# Patient Record
Sex: FEMALE | Race: BLACK OR AFRICAN AMERICAN | NOT HISPANIC OR LATINO
[De-identification: names, ages, dates, MRNs, and addresses within clinical notes are randomized per-mention and may not be internally consistent; named-entity substitution may affect disease eponyms.]

---

## 2018-03-29 ENCOUNTER — APPOINTMENT (OUTPATIENT)
Dept: OBGYN | Facility: CLINIC | Age: 38
End: 2018-03-29
Payer: COMMERCIAL

## 2018-03-29 VITALS
HEIGHT: 66 IN | WEIGHT: 182.44 LBS | DIASTOLIC BLOOD PRESSURE: 95 MMHG | BODY MASS INDEX: 29.32 KG/M2 | SYSTOLIC BLOOD PRESSURE: 139 MMHG | HEART RATE: 76 BPM

## 2018-03-29 PROCEDURE — 99205 OFFICE O/P NEW HI 60 MIN: CPT

## 2018-05-01 ENCOUNTER — APPOINTMENT (OUTPATIENT)
Dept: OBGYN | Facility: CLINIC | Age: 38
End: 2018-05-01

## 2018-05-09 ENCOUNTER — OUTPATIENT (OUTPATIENT)
Dept: OUTPATIENT SERVICES | Facility: HOSPITAL | Age: 38
LOS: 1 days | End: 2018-05-09

## 2018-05-09 VITALS
TEMPERATURE: 98 F | HEART RATE: 79 BPM | SYSTOLIC BLOOD PRESSURE: 130 MMHG | HEIGHT: 60.25 IN | DIASTOLIC BLOOD PRESSURE: 80 MMHG | RESPIRATION RATE: 16 BRPM | WEIGHT: 179.9 LBS

## 2018-05-09 DIAGNOSIS — Z90.49 ACQUIRED ABSENCE OF OTHER SPECIFIED PARTS OF DIGESTIVE TRACT: Chronic | ICD-10-CM

## 2018-05-09 DIAGNOSIS — N83.9 NONINFLAMMATORY DISORDER OF OVARY, FALLOPIAN TUBE AND BROAD LIGAMENT, UNSPECIFIED: ICD-10-CM

## 2018-05-09 DIAGNOSIS — Z98.890 OTHER SPECIFIED POSTPROCEDURAL STATES: Chronic | ICD-10-CM

## 2018-05-09 DIAGNOSIS — R23.8 OTHER SKIN CHANGES: ICD-10-CM

## 2018-05-09 DIAGNOSIS — D25.0 SUBMUCOUS LEIOMYOMA OF UTERUS: ICD-10-CM

## 2018-05-09 LAB
APTT BLD: 37.6 SEC — HIGH (ref 27.5–37.4)
BLD GP AB SCN SERPL QL: NEGATIVE — SIGNIFICANT CHANGE UP
BUN SERPL-MCNC: 16 MG/DL — SIGNIFICANT CHANGE UP (ref 7–23)
CALCIUM SERPL-MCNC: 9.5 MG/DL — SIGNIFICANT CHANGE UP (ref 8.4–10.5)
CHLORIDE SERPL-SCNC: 102 MMOL/L — SIGNIFICANT CHANGE UP (ref 98–107)
CO2 SERPL-SCNC: 26 MMOL/L — SIGNIFICANT CHANGE UP (ref 22–31)
CREAT SERPL-MCNC: 0.8 MG/DL — SIGNIFICANT CHANGE UP (ref 0.5–1.3)
GLUCOSE SERPL-MCNC: 65 MG/DL — LOW (ref 70–99)
HCT VFR BLD CALC: 40.5 % — SIGNIFICANT CHANGE UP (ref 34.5–45)
HGB BLD-MCNC: 12.8 G/DL — SIGNIFICANT CHANGE UP (ref 11.5–15.5)
INR BLD: 1.08 — SIGNIFICANT CHANGE UP (ref 0.88–1.17)
MCHC RBC-ENTMCNC: 27.2 PG — SIGNIFICANT CHANGE UP (ref 27–34)
MCHC RBC-ENTMCNC: 31.6 % — LOW (ref 32–36)
MCV RBC AUTO: 86 FL — SIGNIFICANT CHANGE UP (ref 80–100)
NRBC # FLD: 0 — SIGNIFICANT CHANGE UP
PLATELET # BLD AUTO: 250 K/UL — SIGNIFICANT CHANGE UP (ref 150–400)
PMV BLD: 11.5 FL — SIGNIFICANT CHANGE UP (ref 7–13)
POTASSIUM SERPL-MCNC: 4.1 MMOL/L — SIGNIFICANT CHANGE UP (ref 3.5–5.3)
POTASSIUM SERPL-SCNC: 4.1 MMOL/L — SIGNIFICANT CHANGE UP (ref 3.5–5.3)
PROTHROM AB SERPL-ACNC: 12 SEC — SIGNIFICANT CHANGE UP (ref 9.8–13.1)
RBC # BLD: 4.71 M/UL — SIGNIFICANT CHANGE UP (ref 3.8–5.2)
RBC # FLD: 13.6 % — SIGNIFICANT CHANGE UP (ref 10.3–14.5)
RH IG SCN BLD-IMP: POSITIVE — SIGNIFICANT CHANGE UP
SODIUM SERPL-SCNC: 140 MMOL/L — SIGNIFICANT CHANGE UP (ref 135–145)
WBC # BLD: 6.52 K/UL — SIGNIFICANT CHANGE UP (ref 3.8–10.5)
WBC # FLD AUTO: 6.52 K/UL — SIGNIFICANT CHANGE UP (ref 3.8–10.5)

## 2018-05-09 NOTE — H&P PST ADULT - ASSESSMENT
submucous leiomyoma of uterus , noninflammatory disorder of ovary , fallopian tube and broad ligament unspecified

## 2018-05-09 NOTE — H&P PST ADULT - PROBLEM SELECTOR PLAN 2
pt reports bleeding easily , bruising easily . Pt had transfusions of FFP with myomectomy surgery 2011 at [facility] . Hematology consult pending , pt/ptt pending in pst. Call placed with Dr Win for plan and hematology referral.

## 2018-05-09 NOTE — H&P PST ADULT - TEACHING/LEARNING LEARNING PREFERENCES
video/written material/pictorial/computer/internet/skill demonstration/individual instruction/group instruction/audio/verbal instruction

## 2018-05-09 NOTE — H&P PST ADULT - GASTROINTESTINAL DETAILS
no distention/no masses palpable/soft/bowel sounds normal soft/no masses palpable/bowel sounds normal

## 2018-05-09 NOTE — H&P PST ADULT - NSANTHOSAYNRD_GEN_A_CORE
No. IZA screening performed.  STOP BANG Legend: 0-2 = LOW Risk; 3-4 = INTERMEDIATE Risk; 5-8 = HIGH Risk

## 2018-05-09 NOTE — H&P PST ADULT - RS GEN PE MLT RESP DETAILS PC
clear to auscultation bilaterally/breath sounds equal/good air movement/no rhonchi/no rales/no wheezes/respirations non-labored

## 2018-05-09 NOTE — H&P PST ADULT - PSH
S/P appendectomy  10/16/1998 in [place] , pt reports bleeding issue with this surgery  S/P myomectomy  11/22/2011 [facility]  S/P right breast biopsy  2000

## 2018-05-09 NOTE — H&P PST ADULT - PROBLEM SELECTOR PLAN 1
Dilation and curettage hysteroscopy with symphion , treatment of endometriosis , lysis of adhesions , possible laparoscopic salpingectomy

## 2018-05-09 NOTE — H&P PST ADULT - LYMPHATIC
supraclavicular L/anterior cervical L/supraclavicular R/posterior cervical R/anterior cervical R/posterior cervical L

## 2018-05-09 NOTE — H&P PST ADULT - HISTORY OF PRESENT ILLNESS
This is a 38 y.o. female LMP 18 . Pt has history of fibroids 2011 s/p myomectomy . Pt reports pain 6 weeks after that surgery , RLQ abdominal pain. Pt reports multiple evaluations with Hospital ER for this pain. Pt referred to Dr Mari , had sono , MRI of abdomen, pelvis. Pt evaluated , referred to Dr Win, evaluated, pt has submucous leiomyoma of uterus , noninflammatory disorder of ovary , fallopian tube and broad ligament,unspecified. This is a 38 y.o. female LMP 18 . Pt has history of fibroids 2011 s/p myomectomy .PT reports bleeding issues with his surgery , received FFP at [facility].  Pt reports pain 6 weeks after that surgery , RLQ abdominal pain. Pt reports multiple evaluations with Hospital ER for this pain. Pt referred to Dr Mari , had sono , MRI of abdomen, pelvis. Pt evaluated , referred to Dr Win, evaluated, pt has submucous leiomyoma of uterus , noninflammatory disorder of ovary , fallopian tube and broad ligament, unspecified.

## 2018-05-11 ENCOUNTER — OUTPATIENT (OUTPATIENT)
Dept: OUTPATIENT SERVICES | Facility: HOSPITAL | Age: 38
LOS: 1 days | Discharge: HOME | End: 2018-05-11

## 2018-05-11 DIAGNOSIS — Z98.890 OTHER SPECIFIED POSTPROCEDURAL STATES: Chronic | ICD-10-CM

## 2018-05-11 DIAGNOSIS — Z90.49 ACQUIRED ABSENCE OF OTHER SPECIFIED PARTS OF DIGESTIVE TRACT: Chronic | ICD-10-CM

## 2018-05-11 DIAGNOSIS — Z01.818 ENCOUNTER FOR OTHER PREPROCEDURAL EXAMINATION: ICD-10-CM

## 2018-05-15 ENCOUNTER — RESULT REVIEW (OUTPATIENT)
Age: 38
End: 2018-05-15

## 2018-05-15 ENCOUNTER — APPOINTMENT (OUTPATIENT)
Dept: HEMATOLOGY ONCOLOGY | Facility: CLINIC | Age: 38
End: 2018-05-15
Payer: COMMERCIAL

## 2018-05-15 VITALS
SYSTOLIC BLOOD PRESSURE: 126 MMHG | RESPIRATION RATE: 16 BRPM | OXYGEN SATURATION: 99 % | WEIGHT: 181.44 LBS | HEART RATE: 61 BPM | HEIGHT: 66 IN | BODY MASS INDEX: 29.16 KG/M2 | DIASTOLIC BLOOD PRESSURE: 84 MMHG | TEMPERATURE: 98.4 F

## 2018-05-15 DIAGNOSIS — Z87.09 PERSONAL HISTORY OF OTHER DISEASES OF THE RESPIRATORY SYSTEM: ICD-10-CM

## 2018-05-15 DIAGNOSIS — Z82.49 FAMILY HISTORY OF ISCHEMIC HEART DISEASE AND OTHER DISEASES OF THE CIRCULATORY SYSTEM: ICD-10-CM

## 2018-05-15 LAB
APTT BLD: 37.4 SEC
BASOPHILS # BLD AUTO: 0 K/UL — SIGNIFICANT CHANGE UP (ref 0–0.2)
EOSINOPHIL # BLD AUTO: 0.5 K/UL — SIGNIFICANT CHANGE UP (ref 0–0.5)
EOSINOPHIL NFR BLD AUTO: 9 % — HIGH (ref 0–6)
FIBRINOGEN PPP COAG.DERIVED-MCNC: 486 MG/DL
HCT VFR BLD CALC: 37.6 % — SIGNIFICANT CHANGE UP (ref 34.5–45)
HGB BLD-MCNC: 12.8 G/DL — SIGNIFICANT CHANGE UP (ref 11.5–15.5)
INR PPP: 1.07 RATIO
LYMPHOCYTES # BLD AUTO: 2.5 K/UL — SIGNIFICANT CHANGE UP (ref 1–3.3)
LYMPHOCYTES # BLD AUTO: 41 % — SIGNIFICANT CHANGE UP (ref 13–44)
MCHC RBC-ENTMCNC: 28.7 PG — SIGNIFICANT CHANGE UP (ref 27–34)
MCHC RBC-ENTMCNC: 34.1 G/DL — SIGNIFICANT CHANGE UP (ref 32–36)
MCV RBC AUTO: 84 FL — SIGNIFICANT CHANGE UP (ref 80–100)
MONOCYTES # BLD AUTO: 0.5 K/UL — SIGNIFICANT CHANGE UP (ref 0–0.9)
MONOCYTES NFR BLD AUTO: 9 % — SIGNIFICANT CHANGE UP (ref 2–14)
NEUTROPHILS # BLD AUTO: 2.4 K/UL — SIGNIFICANT CHANGE UP (ref 1.8–7.4)
NEUTROPHILS NFR BLD AUTO: 41 % — LOW (ref 43–77)
PLAT MORPH BLD: NORMAL — SIGNIFICANT CHANGE UP
PLATELET # BLD AUTO: 254 K/UL — SIGNIFICANT CHANGE UP (ref 150–400)
PT BLD: 12.1 SEC
RBC # BLD: 4.47 M/UL — SIGNIFICANT CHANGE UP (ref 3.8–5.2)
RBC # FLD: 12.7 % — SIGNIFICANT CHANGE UP (ref 10.3–14.5)
RBC BLD AUTO: SIGNIFICANT CHANGE UP
TT CONT PPP: 22 SECS
VWF AG PPP IA-ACNC: 105 %
VWF:RCO ACT/NOR PPP PL AGG: 76 %
WBC # BLD: 5.9 K/UL — SIGNIFICANT CHANGE UP (ref 3.8–10.5)
WBC # FLD AUTO: 5.9 K/UL — SIGNIFICANT CHANGE UP (ref 3.8–10.5)

## 2018-05-15 PROCEDURE — 99245 OFF/OP CONSLTJ NEW/EST HI 55: CPT

## 2018-05-16 LAB — REPTILASE TIME: 20.1 SEC

## 2018-05-18 ENCOUNTER — APPOINTMENT (OUTPATIENT)
Dept: HEMATOLOGY ONCOLOGY | Facility: CLINIC | Age: 38
End: 2018-05-18

## 2018-05-18 ENCOUNTER — LABORATORY RESULT (OUTPATIENT)
Age: 38
End: 2018-05-18

## 2018-05-18 ENCOUNTER — RESULT REVIEW (OUTPATIENT)
Age: 38
End: 2018-05-18

## 2018-05-18 LAB
BASOPHILS # BLD AUTO: 0 K/UL — SIGNIFICANT CHANGE UP (ref 0–0.2)
BASOPHILS NFR BLD AUTO: 0.5 % — SIGNIFICANT CHANGE UP (ref 0–2)
EOSINOPHIL # BLD AUTO: 0.4 K/UL — SIGNIFICANT CHANGE UP (ref 0–0.5)
EOSINOPHIL NFR BLD AUTO: 9.6 % — HIGH (ref 0–6)
FIBRINOGEN PPP COAG.DERIVED-MCNC: 541 MG/DL
FSP TITR PPP LA: < 5 UG/ML
HCT VFR BLD CALC: 41.4 % — SIGNIFICANT CHANGE UP (ref 34.5–45)
HGB BLD-MCNC: 13.7 G/DL — SIGNIFICANT CHANGE UP (ref 11.5–15.5)
LYMPHOCYTES # BLD AUTO: 1.7 K/UL — SIGNIFICANT CHANGE UP (ref 1–3.3)
LYMPHOCYTES # BLD AUTO: 37.1 % — SIGNIFICANT CHANGE UP (ref 13–44)
MCHC RBC-ENTMCNC: 28 PG — SIGNIFICANT CHANGE UP (ref 27–34)
MCHC RBC-ENTMCNC: 33 G/DL — SIGNIFICANT CHANGE UP (ref 32–36)
MCV RBC AUTO: 84.8 FL — SIGNIFICANT CHANGE UP (ref 80–100)
MONOCYTES # BLD AUTO: 0.2 K/UL — SIGNIFICANT CHANGE UP (ref 0–0.9)
MONOCYTES NFR BLD AUTO: 4.8 % — SIGNIFICANT CHANGE UP (ref 2–14)
NEUTROPHILS # BLD AUTO: 2.2 K/UL — SIGNIFICANT CHANGE UP (ref 1.8–7.4)
NEUTROPHILS NFR BLD AUTO: 48 % — SIGNIFICANT CHANGE UP (ref 43–77)
PLATELET # BLD AUTO: 265 K/UL — SIGNIFICANT CHANGE UP (ref 150–400)
RBC # BLD: 4.88 M/UL — SIGNIFICANT CHANGE UP (ref 3.8–5.2)
RBC # FLD: 12.6 % — SIGNIFICANT CHANGE UP (ref 10.3–14.5)
WBC # BLD: 4.6 K/UL — SIGNIFICANT CHANGE UP (ref 3.8–10.5)
WBC # FLD AUTO: 4.6 K/UL — SIGNIFICANT CHANGE UP (ref 3.8–10.5)

## 2018-05-21 ENCOUNTER — LABORATORY RESULT (OUTPATIENT)
Age: 38
End: 2018-05-21

## 2018-05-21 LAB
FACT XIIIA PPP-ACNC: 50 %
PLASM INHIB ACT/NOR PPP CHRO: 106 %
PLG PPP CHRO-ACNC: 118 %

## 2018-05-22 LAB — TPA AG PPP IA-MCNC: <4 IU/ML

## 2018-06-11 LAB
PAI1 AG PPP IA-ACNC: >200 NG/ML
TISS POLYPEPT AG SERPL-MCNC: 6.7 NG/ML

## 2018-06-22 ENCOUNTER — APPOINTMENT (OUTPATIENT)
Dept: INTERNAL MEDICINE | Facility: CLINIC | Age: 38
End: 2018-06-22
Payer: COMMERCIAL

## 2018-06-22 VITALS
WEIGHT: 184 LBS | HEART RATE: 77 BPM | SYSTOLIC BLOOD PRESSURE: 150 MMHG | HEIGHT: 66 IN | BODY MASS INDEX: 29.57 KG/M2 | OXYGEN SATURATION: 98 % | DIASTOLIC BLOOD PRESSURE: 80 MMHG | TEMPERATURE: 98.2 F

## 2018-06-22 PROCEDURE — 99204 OFFICE O/P NEW MOD 45 MIN: CPT

## 2018-06-24 NOTE — REVIEW OF SYSTEMS
[Nasal Discharge] : nasal discharge [Wheezing] : wheezing [Negative] : Gastrointestinal [de-identified] : R ankle pain

## 2018-06-24 NOTE — ASSESSMENT
[FreeTextEntry1] : 39yo F with hx of allergies, nasal congestion here for new pt visits. Hx of bleeding with surgery has seen hematology for this. \par \par Excessive bleeding - ddx include genetic disorder, medication se. pt is off all supplements, not taking prenatals 2.2 concern for other substances in them; has had appt with heme onc. Will ask pt to follow up +/- ask md to comment on results of blood work\par \par nasal congestion - advised to discuss anesthesia options with ent surgeon\par \par Pelvic pain - most likely from fibroids - surgery planned for July 2018. needs clearance from heme onc and general medicine. will try IVF after surgery\par \par \par plan for medical clearance at next office visit.

## 2018-06-24 NOTE — HISTORY OF PRESENT ILLNESS
[FreeTextEntry1] : bleeding issue [de-identified] : 37yo F with reactive airway disease/asthma  who comes into establish care. 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

## 2018-06-24 NOTE — PHYSICAL EXAM
[No Acute Distress] : no acute distress [Well Nourished] : well nourished [PERRL] : pupils equal round and reactive to light [EOMI] : extraocular movements intact [Normal Oropharynx] : the oropharynx was normal [Supple] : supple [No Respiratory Distress] : no respiratory distress  [Clear to Auscultation] : lungs were clear to auscultation bilaterally [Regular Rhythm] : with a regular rhythm [Normal S1, S2] : normal S1 and S2 [Non Tender] : non-tender [Non-distended] : non-distended [Normal Posterior Cervical Nodes] : no posterior cervical lymphadenopathy [Normal Anterior Cervical Nodes] : no anterior cervical lymphadenopathy [Normal Affect] : the affect was normal [Normal Insight/Judgement] : insight and judgment were intact [de-identified] : can hear her breathing 2/2 pronounced nasal stuffiness [de-identified] : swollen turbinates, ?polyps visible

## 2018-07-03 ENCOUNTER — APPOINTMENT (OUTPATIENT)
Dept: INTERNAL MEDICINE | Facility: CLINIC | Age: 38
End: 2018-07-03
Payer: COMMERCIAL

## 2018-07-03 VITALS
HEIGHT: 63.5 IN | SYSTOLIC BLOOD PRESSURE: 120 MMHG | BODY MASS INDEX: 30.8 KG/M2 | DIASTOLIC BLOOD PRESSURE: 84 MMHG | TEMPERATURE: 98.3 F | HEART RATE: 71 BPM | OXYGEN SATURATION: 98 % | WEIGHT: 176 LBS

## 2018-07-03 PROCEDURE — 99215 OFFICE O/P EST HI 40 MIN: CPT | Mod: 25

## 2018-07-03 PROCEDURE — 36415 COLL VENOUS BLD VENIPUNCTURE: CPT

## 2018-07-03 RX ORDER — FOLIC ACID-PYRIDOXINE-CYANOCOBALAMIN TAB 2.5-25-2 MG 2.5-25-2 MG
2.5-25-2 TAB ORAL
Qty: 90 | Refills: 0 | Status: DISCONTINUED | COMMUNITY
Start: 2018-01-29

## 2018-07-03 RX ORDER — DESONIDE 0.5 MG/ML
0.05 LOTION TOPICAL
Qty: 118 | Refills: 0 | Status: COMPLETED | COMMUNITY
Start: 2018-05-15 | End: 2018-07-03

## 2018-07-03 RX ORDER — DOXYCYCLINE HYCLATE 100 MG/1
100 TABLET ORAL
Qty: 10 | Refills: 0 | Status: DISCONTINUED | COMMUNITY
Start: 2018-03-01

## 2018-07-03 RX ORDER — DICLOFENAC SODIUM 10 MG/G
1 GEL TOPICAL
Qty: 100 | Refills: 0 | Status: COMPLETED | COMMUNITY
Start: 2018-06-04 | End: 2018-07-03

## 2018-07-03 RX ORDER — IBUPROFEN 400 MG/1
400 TABLET, FILM COATED ORAL
Qty: 60 | Refills: 0 | Status: COMPLETED | COMMUNITY
Start: 2018-06-04 | End: 2018-07-03

## 2018-07-03 RX ORDER — PRENATAL VIT 49/IRON FUM/FOLIC 6.75-0.2MG
TABLET ORAL
Refills: 0 | Status: COMPLETED | COMMUNITY
End: 2018-07-03

## 2018-07-03 RX ORDER — AMMONIUM LACTATE 12 %
12 CREAM (GRAM) TOPICAL
Qty: 385 | Refills: 0 | Status: DISCONTINUED | COMMUNITY
Start: 2018-04-28

## 2018-07-03 RX ORDER — ECONAZOLE NITRATE 10 MG/G
1 CREAM TOPICAL
Qty: 85 | Refills: 0 | Status: COMPLETED | COMMUNITY
Start: 2018-06-09 | End: 2018-07-03

## 2018-07-03 RX ORDER — ERGOCALCIFEROL 1.25 MG/1
1.25 MG CAPSULE, LIQUID FILLED ORAL
Qty: 4 | Refills: 0 | Status: DISCONTINUED | COMMUNITY
Start: 2018-01-26

## 2018-07-03 NOTE — ASSESSMENT
[Patient Optimized for Surgery] : Patient optimized for surgery [No Further Testing Recommended] : no further testing recommended [FreeTextEntry4] : 37yo F with PMH of with hx of fibroids, asthma, allergic rhinitis s/p sinus surgery on 6/25/18 who comes in for evaluation prior to undergoing D+C hysteroscopy, treatment of endometriosis and lysis of adhesion with Dr. Win on 7/16/18.\par \par Pt is able to achieve 10 METS. She is limited by breathing problems but recently under sinus surgery on 6/25/18. She is currently completing a course of abx post op and will be on prednisone for 3 additional weeks.\par Of the RCRI, pt has 0 RF. this places her in a low risk category.\par It should be noted she had an episode of itching after a surgery but had also received FFP for bleeding episode.\par Anesthesia team should be mindful of this history though the reaction is more likely to be from blood products than anesthesia.\par \par Would recommend perioperative insulin if sugars run high secondary to use of prednisone.\par Perioperative incentive spirometer  and nebulizer as pt with hx of asthma though has not had a flare in years. \par Pt was seen by hematology for concern for bleeding disorder. Dr. Ramos notes no cause of bleeding was found on blood work and pt is cleared from Hematology stand point (See chart note June 22, 2018).\par \par No EKG performed as pt is w/o cardiac hx and is under age 50.\par Pt advised to avoid asa, nsaid and herbal meds in the week leading up to the surgery.

## 2018-07-03 NOTE — HISTORY OF PRESENT ILLNESS
[Asthma] : asthma [Coronary Artery Disease] : no coronary artery disease [Diabetes] : no diabetes [Sleep Apnea] : no sleep apnea [COPD] : no COPD [FreeTextEntry1] : D&C hysteroscopy, lysis of adhesions [FreeTextEntry2] : 7/16/18 [FreeTextEntry3] : Dr. Win [FreeTextEntry4] : 37yo F her for pre op evaluation \par PMH:chronic rhinitis, asthma, and fibroids who comes in for preoperative evaluation\par Surgeries: appendectomy, R fibroadenoma removed, myomectomy, sinus surgery with removal of polyp/mucosa and bone\par \par After the myomectomy pt was given fresh frozen plasma. She developed severe itching and was given benadryl.\par She has not had such itching with other surgeries but it was unclear if response was to ffp or anesthesia.\par No known family hx of rxn to anesthesia.   \par \par Allergies: no drug allergies\par \par ROS: no headaches, vision changes,\par Hx of congestion from sinuses but improved after surgery, no wheezing, no cp, no n/v/d.\par Constipation after nasal surgery improved. \par Remainder of ROS reviewed and found to be negative.\par \par Meds: amoxicillin (end date: 7/8/18), prednisone (end date: 7/23/18), tramadol (not taking now), PRN tylenol, saline nasal spray, double antibiotic  ointment \par Soc Hx: no tobacco, no etoh, no drugs

## 2018-07-03 NOTE — PHYSICAL EXAM
[No Acute Distress] : no acute distress [Well Nourished] : well nourished [PERRL] : pupils equal round and reactive to light [EOMI] : extraocular movements intact [Normal Oropharynx] : the oropharynx was normal [Supple] : supple [No Respiratory Distress] : no respiratory distress  [Clear to Auscultation] : lungs were clear to auscultation bilaterally [Regular Rhythm] : with a regular rhythm [Normal S1, S2] : normal S1 and S2 [Non Tender] : non-tender [Non-distended] : non-distended [Normal Posterior Cervical Nodes] : no posterior cervical lymphadenopathy [Normal Anterior Cervical Nodes] : no anterior cervical lymphadenopathy [Normal Affect] : the affect was normal [Normal Insight/Judgement] : insight and judgment were intact [de-identified] : breathing is much improved since last visit [de-identified] : nasal passage patent; healing scar in R nostril, smaller scar in L, +mucous

## 2018-07-05 ENCOUNTER — OUTPATIENT (OUTPATIENT)
Dept: OUTPATIENT SERVICES | Facility: HOSPITAL | Age: 38
LOS: 1 days | End: 2018-07-05

## 2018-07-05 VITALS
HEART RATE: 60 BPM | TEMPERATURE: 98 F | DIASTOLIC BLOOD PRESSURE: 74 MMHG | WEIGHT: 179.02 LBS | OXYGEN SATURATION: 98 % | SYSTOLIC BLOOD PRESSURE: 110 MMHG | RESPIRATION RATE: 16 BRPM | HEIGHT: 64.5 IN

## 2018-07-05 DIAGNOSIS — N83.9 NONINFLAMMATORY DISORDER OF OVARY, FALLOPIAN TUBE AND BROAD LIGAMENT, UNSPECIFIED: ICD-10-CM

## 2018-07-05 DIAGNOSIS — Z98.890 OTHER SPECIFIED POSTPROCEDURAL STATES: Chronic | ICD-10-CM

## 2018-07-05 DIAGNOSIS — J34.9 UNSPECIFIED DISORDER OF NOSE AND NASAL SINUSES: ICD-10-CM

## 2018-07-05 DIAGNOSIS — D69.9 HEMORRHAGIC CONDITION, UNSPECIFIED: ICD-10-CM

## 2018-07-05 DIAGNOSIS — Z90.49 ACQUIRED ABSENCE OF OTHER SPECIFIED PARTS OF DIGESTIVE TRACT: Chronic | ICD-10-CM

## 2018-07-05 DIAGNOSIS — D25.9 LEIOMYOMA OF UTERUS, UNSPECIFIED: ICD-10-CM

## 2018-07-05 LAB
BLD GP AB SCN SERPL QL: NEGATIVE — SIGNIFICANT CHANGE UP
RH IG SCN BLD-IMP: POSITIVE — SIGNIFICANT CHANGE UP

## 2018-07-05 RX ORDER — IBUPROFEN 200 MG
1 TABLET ORAL
Qty: 0 | Refills: 0 | COMMUNITY

## 2018-07-05 RX ORDER — AMOXICILLIN 250 MG/5ML
0 SUSPENSION, RECONSTITUTED, ORAL (ML) ORAL
Qty: 0 | Refills: 0 | COMMUNITY

## 2018-07-05 RX ORDER — CHOLECALCIFEROL (VITAMIN D3) 125 MCG
1 CAPSULE ORAL
Qty: 0 | Refills: 0 | COMMUNITY

## 2018-07-05 NOTE — H&P PST ADULT - LAB RESULTS AND INTERPRETATION
cbc, cmp, ptptt, hgba1c done 7/05/18  t&S done 7/03/18  cup provided for [facility] [place] [place] dos

## 2018-07-05 NOTE — H&P PST ADULT - REASON FOR ADMISSION
"I ma having surgery for fiubroids and possible adhesions "I ma having surgery for fibroids and possible adhesions

## 2018-07-05 NOTE — H&P PST ADULT - HISTORY OF PRESENT ILLNESS
Pt is a 38 y.o. female ; LMP 7/03/18. Pt reports h/o fibroids; s/p Myomectomy ; pt reports post op bleeding ; tx FFP @ [place] Pt reports RLQ pain 6 weeks post op ; pt states has increased in severity ; pt reports extensive w/u ; pt reports " fibroids and adhesions" Pt now presents for Dilation Curettage Hysteroscopy with Symphion Treatment of Endometriosis, Lysis of Adhesions    Pt initially seen in pst 5/25/18 ; surgery postponed pending hematology evaluation

## 2018-07-05 NOTE — H&P PST ADULT - ENT GEN HX ROS MEA POS PC
sinus symptoms/sinus pressure right nostril ; f/u 7/11/18 @ [facility]

## 2018-07-05 NOTE — H&P PST ADULT - PROBLEM SELECTOR PLAN 2
Pre op hematology evaluation done    As per pt record obtained from [facility]

## 2018-07-05 NOTE — H&P PST ADULT - PROBLEM SELECTOR PLAN 1
Dilation Curettage Hysteroscopy with Symphion , Treatment of Endometriosis, Lysis of Adhesions    Pre op instructions including Pepcid given to pt tp appears to have a good understanding of pre op instructions     Pt to Dr Riggs for pre op m/c

## 2018-07-05 NOTE — H&P PST ADULT - PSH
S/P appendectomy  10/16/1998 in [place] , pt reports bleeding issue with this surgery  S/P myomectomy  11/22/2011 [facility]  S/P right breast biopsy  2000  Status post functional endoscopic sinus surgery (FESS)  6/25/2018

## 2018-07-09 LAB
25(OH)D3 SERPL-MCNC: 24.4 NG/ML
ALBUMIN SERPL ELPH-MCNC: 4.5 G/DL
ALP BLD-CCNC: 47 U/L
ALT SERPL-CCNC: 15 U/L
ANION GAP SERPL CALC-SCNC: 17 MMOL/L
AST SERPL-CCNC: 17 U/L
BASOPHILS # BLD AUTO: 0.01 K/UL
BASOPHILS NFR BLD AUTO: 0.1 %
BILIRUB SERPL-MCNC: 0.5 MG/DL
BUN SERPL-MCNC: 16 MG/DL
CALCIUM SERPL-MCNC: 9.7 MG/DL
CHLORIDE SERPL-SCNC: 100 MMOL/L
CHOLEST SERPL-MCNC: 208 MG/DL
CHOLEST/HDLC SERPL: 3 RATIO
CO2 SERPL-SCNC: 24 MMOL/L
CREAT SERPL-MCNC: 0.77 MG/DL
EOSINOPHIL # BLD AUTO: 0.01 K/UL
EOSINOPHIL NFR BLD AUTO: 0.1 %
GLUCOSE SERPL-MCNC: 93 MG/DL
HBA1C MFR BLD HPLC: 5.6 %
HCT VFR BLD CALC: 38.8 %
HDLC SERPL-MCNC: 70 MG/DL
HGB BLD-MCNC: 11.8 G/DL
IMM GRANULOCYTES NFR BLD AUTO: 0.1 %
INR PPP: 1.09 RATIO
LDLC SERPL CALC-MCNC: 126 MG/DL
LYMPHOCYTES # BLD AUTO: 2.21 K/UL
LYMPHOCYTES NFR BLD AUTO: 25.7 %
MAN DIFF?: NORMAL
MCHC RBC-ENTMCNC: 26.4 PG
MCHC RBC-ENTMCNC: 30.4 GM/DL
MCV RBC AUTO: 86.8 FL
MONOCYTES # BLD AUTO: 0.4 K/UL
MONOCYTES NFR BLD AUTO: 4.7 %
NEUTROPHILS # BLD AUTO: 5.96 K/UL
NEUTROPHILS NFR BLD AUTO: 69.3 %
PLATELET # BLD AUTO: 325 K/UL
POTASSIUM SERPL-SCNC: 4 MMOL/L
PROT SERPL-MCNC: 7.8 G/DL
PT BLD: 12.3 SEC
RBC # BLD: 4.47 M/UL
RBC # FLD: 14.5 %
SODIUM SERPL-SCNC: 141 MMOL/L
TRIGL SERPL-MCNC: 60 MG/DL
TSH SERPL-ACNC: 0.31 UIU/ML
WBC # FLD AUTO: 8.6 K/UL

## 2018-07-09 NOTE — H&P PST ADULT - PRO PAIN EXPRESSION
Weeks 26 to 30 of Your Pregnancy: Care Instructions  Your Care Instructions    You are now in your last trimester of pregnancy. Your baby is growing rapidly. And you'll probably feel your baby moving around more often. Your doctor may ask you to count your baby's kicks. Your back may ache as your body gets used to your baby's size and length. If you haven't already had the Tdap shot during this pregnancy, talk to your doctor about getting it. It will help protect your  against pertussis infection. During this time, it's important to take care of yourself and pay attention to what your body needs. If you feel sexual, explore ways to be close with your partner that match your comfort and desire. Use the tips provided in this care sheet to find ways to be sexual in your own way. Follow-up care is a key part of your treatment and safety. Be sure to make and go to all appointments, and call your doctor if you are having problems. It's also a good idea to know your test results and keep a list of the medicines you take. How can you care for yourself at home? Take it easy at work  · Take frequent breaks. If possible, stop working when you are tired, and rest during your lunch hour. · Take bathroom breaks every 2 hours. · Change positions often. If you sit for long periods, stand up and walk around. · When you stand for a long time, keep one foot on a low stool with your knee bent. After standing a lot, sit with your feet up. · Avoid fumes, chemicals, and tobacco smoke. Be sexual in your own way  · Having sex during pregnancy is okay, unless your doctor tells you not to. · You may be very interested in sex, or you may have no interest at all. · Your growing belly can make it hard to find a good position during intercourse. Butternut and explore. · You may get cramps in your uterus when your partner touches your breasts.   · A back rub may relieve the backache or cramps that sometimes follow orgasm. Learn about  labor  · Watch for signs of  labor. You may be going into labor if:  ¨ You have menstrual-like cramps, with or without nausea. ¨ You have about 4 or more contractions in 20 minutes, or about 8 or more within 1 hour, even after you have had a glass of water and are resting. ¨ You have a low, dull backache that does not go away when you change your position. ¨ You have pain or pressure in your pelvis that comes and goes in a pattern. ¨ You have intestinal cramping or flu-like symptoms, with or without diarrhea. ¨ You notice an increase or change in your vaginal discharge. Discharge may be heavy, mucus-like, watery, or streaked with blood. ¨ Your water breaks. · If you think you have  labor:  ¨ Drink 2 or 3 glasses of water or juice. Not drinking enough fluids can cause contractions. ¨ Stop what you are doing, and empty your bladder. Then lie down on your left side for at least 1 hour. ¨ While lying on your side, find your breast bone. Put your fingers in the soft spot just below it. Move your fingers down toward your belly button to find the top of your uterus. Check to see if it is tight. ¨ Contractions can be weak or strong. Record your contractions for an hour. Time a contraction from the start of one contraction to the start of the next one. ¨ Single or several strong contractions without a pattern are called David-Sawyer contractions. They are practice contractions but not the start of labor. They often stop if you change what you are doing. ¨ Call your doctor if you have regular contractions. Where can you learn more? Go to http://valerie-quique.info/. Enter X092 in the search box to learn more about \"Weeks 26 to 30 of Your Pregnancy: Care Instructions. \"  Current as of: 2017  Content Version: 11.4  © 7076-9800 [employer].  Care instructions adapted under license by [employer] (which disclaims liability or warranty for this information). If you have questions about a medical condition or this instruction, always ask your healthcare professional. [place] [address] any warranty or liability for your use of this information. verbalization

## 2018-07-13 NOTE — ASU PATIENT PROFILE, ADULT - PSH
S/P appendectomy  10/16/1998 in [place] , pt reports bleeding issue with this surgery  S/P myomectomy  11/22/2011 [facility]  S/P right breast biopsy  2000  Status post functional endoscopic sinus surgery (FESS)  6/25/2018

## 2018-07-13 NOTE — ASU PATIENT PROFILE, ADULT - ANESTHESIA, PREVIOUS REACTION, PROFILE
2011 possible reaction to anesthesia versus ffp ; pending records from [facility] none/2011 possible reaction to anesthesia versus ffp ; pending records from [facility]

## 2018-07-15 ENCOUNTER — TRANSCRIPTION ENCOUNTER (OUTPATIENT)
Age: 38
End: 2018-07-15

## 2018-07-16 ENCOUNTER — APPOINTMENT (OUTPATIENT)
Dept: OBGYN | Facility: HOSPITAL | Age: 38
End: 2018-07-16

## 2018-07-16 ENCOUNTER — OUTPATIENT (OUTPATIENT)
Dept: OUTPATIENT SERVICES | Facility: HOSPITAL | Age: 38
LOS: 1 days | Discharge: HOME | End: 2018-07-16
Payer: COMMERCIAL

## 2018-07-16 ENCOUNTER — RESULT REVIEW (OUTPATIENT)
Age: 38
End: 2018-07-16

## 2018-07-16 VITALS
HEIGHT: 64 IN | TEMPERATURE: 98 F | WEIGHT: 179.02 LBS | RESPIRATION RATE: 16 BRPM | HEART RATE: 77 BPM | OXYGEN SATURATION: 99 % | SYSTOLIC BLOOD PRESSURE: 132 MMHG | DIASTOLIC BLOOD PRESSURE: 86 MMHG

## 2018-07-16 VITALS
SYSTOLIC BLOOD PRESSURE: 113 MMHG | DIASTOLIC BLOOD PRESSURE: 66 MMHG | RESPIRATION RATE: 14 BRPM | OXYGEN SATURATION: 100 % | HEART RATE: 74 BPM

## 2018-07-16 DIAGNOSIS — Z90.49 ACQUIRED ABSENCE OF OTHER SPECIFIED PARTS OF DIGESTIVE TRACT: Chronic | ICD-10-CM

## 2018-07-16 DIAGNOSIS — Z98.890 OTHER SPECIFIED POSTPROCEDURAL STATES: Chronic | ICD-10-CM

## 2018-07-16 DIAGNOSIS — N83.9 NONINFLAMMATORY DISORDER OF OVARY, FALLOPIAN TUBE AND BROAD LIGAMENT, UNSPECIFIED: ICD-10-CM

## 2018-07-16 PROCEDURE — 88305 TISSUE EXAM BY PATHOLOGIST: CPT | Mod: 26

## 2018-07-16 PROCEDURE — 44180 LAP ENTEROLYSIS: CPT

## 2018-07-16 PROCEDURE — 58350 REOPEN FALLOPIAN TUBE: CPT

## 2018-07-16 PROCEDURE — 58558 HYSTEROSCOPY BIOPSY: CPT

## 2018-07-16 RX ORDER — OXYCODONE HYDROCHLORIDE 5 MG/1
10 TABLET ORAL ONCE
Qty: 0 | Refills: 0 | Status: DISCONTINUED | OUTPATIENT
Start: 2018-07-16 | End: 2018-07-17

## 2018-07-16 RX ORDER — FENTANYL CITRATE 50 UG/ML
50 INJECTION INTRAVENOUS
Qty: 0 | Refills: 0 | Status: DISCONTINUED | OUTPATIENT
Start: 2018-07-16 | End: 2018-07-17

## 2018-07-16 RX ORDER — SODIUM CHLORIDE 9 MG/ML
1000 INJECTION, SOLUTION INTRAVENOUS
Qty: 0 | Refills: 0 | Status: DISCONTINUED | OUTPATIENT
Start: 2018-07-16 | End: 2018-07-17

## 2018-07-16 RX ORDER — OXYCODONE HYDROCHLORIDE 5 MG/1
1 TABLET ORAL
Qty: 10 | Refills: 0 | OUTPATIENT
Start: 2018-07-16

## 2018-07-16 RX ORDER — ALBUTEROL 90 UG/1
2 AEROSOL, METERED ORAL
Qty: 0 | Refills: 0 | COMMUNITY

## 2018-07-16 RX ORDER — ONDANSETRON 8 MG/1
4 TABLET, FILM COATED ORAL ONCE
Qty: 0 | Refills: 0 | Status: COMPLETED | OUTPATIENT
Start: 2018-07-16 | End: 2018-07-16

## 2018-07-16 RX ORDER — ACETAMINOPHEN 500 MG
2 TABLET ORAL
Qty: 0 | Refills: 0 | COMMUNITY

## 2018-07-16 RX ORDER — FENTANYL CITRATE 50 UG/ML
25 INJECTION INTRAVENOUS
Qty: 0 | Refills: 0 | Status: DISCONTINUED | OUTPATIENT
Start: 2018-07-16 | End: 2018-07-17

## 2018-07-16 RX ORDER — OXYCODONE HYDROCHLORIDE 5 MG/1
5 TABLET ORAL ONCE
Qty: 0 | Refills: 0 | Status: DISCONTINUED | OUTPATIENT
Start: 2018-07-16 | End: 2018-07-16

## 2018-07-16 RX ORDER — KETOROLAC TROMETHAMINE 30 MG/ML
30 SYRINGE (ML) INJECTION ONCE
Qty: 0 | Refills: 0 | Status: DISCONTINUED | OUTPATIENT
Start: 2018-07-16 | End: 2018-07-16

## 2018-07-16 RX ORDER — AMOXICILLIN 250 MG/5ML
0 SUSPENSION, RECONSTITUTED, ORAL (ML) ORAL
Qty: 0 | Refills: 0 | COMMUNITY

## 2018-07-16 RX ORDER — IBUPROFEN 200 MG
1 TABLET ORAL
Qty: 0 | Refills: 0 | COMMUNITY

## 2018-07-16 RX ADMIN — Medication 30 MILLIGRAM(S): at 12:13

## 2018-07-16 RX ADMIN — SODIUM CHLORIDE 30 MILLILITER(S): 9 INJECTION, SOLUTION INTRAVENOUS at 07:28

## 2018-07-16 RX ADMIN — FENTANYL CITRATE 50 MICROGRAM(S): 50 INJECTION INTRAVENOUS at 10:49

## 2018-07-16 RX ADMIN — FENTANYL CITRATE 50 MICROGRAM(S): 50 INJECTION INTRAVENOUS at 10:25

## 2018-07-16 RX ADMIN — Medication 25 MILLIGRAM(S): at 12:19

## 2018-07-16 RX ADMIN — ONDANSETRON 4 MILLIGRAM(S): 8 TABLET, FILM COATED ORAL at 10:20

## 2018-07-16 RX ADMIN — FENTANYL CITRATE 50 MICROGRAM(S): 50 INJECTION INTRAVENOUS at 10:34

## 2018-07-16 RX ADMIN — SODIUM CHLORIDE 125 MILLILITER(S): 9 INJECTION, SOLUTION INTRAVENOUS at 10:49

## 2018-07-16 RX ADMIN — Medication 30 MILLIGRAM(S): at 11:50

## 2018-07-16 RX ADMIN — OXYCODONE HYDROCHLORIDE 5 MILLIGRAM(S): 5 TABLET ORAL at 11:37

## 2018-07-16 RX ADMIN — OXYCODONE HYDROCHLORIDE 5 MILLIGRAM(S): 5 TABLET ORAL at 10:49

## 2018-07-16 NOTE — ASU DISCHARGE PLAN (ADULT/PEDIATRIC). - NOTIFY
Pain not relieved by Medications/Persistent Nausea and Vomiting/GYN Fever>100.4/Increased Irritability or Sluggishness/Bleeding that does not stop/Numbness, color, or temperature change to extremity/Inability to Tolerate Liquids or Foods Inability to Tolerate Liquids or Foods/Persistent Nausea and Vomiting/GYN Fever>100.4/Increased Irritability or Sluggishness/Pain not relieved by Medications/Bleeding that does not stop

## 2018-07-16 NOTE — ASU DISCHARGE PLAN (ADULT/PEDIATRIC). - MEDICATION SUMMARY - MEDICATIONS TO TAKE
I will START or STAY ON the medications listed below when I get home from the hospital:    predniSONE 20 mg oral tablet  -- 1 tab(s) by mouth once a dayx 7 days;  then 1/2 tab x 14 days; then 1/2 tab every other day x 7 days  -- Indication: For home me    Percocet 5/325 oral tablet  -- 1 tab(s) by mouth , As Needed  -- Indication: For postop pain as needed    Motrin 600 mg oral tablet  -- 1 tab(s) by mouth every 6 hours, As Needed for moderate pain  -- Indication: For postop pain as needed    Ventolin  -- 2 puff(s) inhaled , As Needed  -- Indication: For home med I will START or STAY ON the medications listed below when I get home from the hospital:    predniSONE 20 mg oral tablet  -- 1 tab(s) by mouth once a dayx 7 days;  then 1/2 tab x 14 days; then 1/2 tab every other day x 7 days  -- Indication: For home me    Motrin 600 mg oral tablet  -- 1 tab(s) by mouth every 6 hours, As Needed for moderate pain  -- Indication: For postop pain as needed    oxyCODONE 5 mg oral tablet  -- 1 tab(s) by mouth every 4 hours as needed for pain MDD:6  -- Caution federal law prohibits the transfer of this drug to any person other  than the person for whom it was prescribed.  It is very important that you take or use this exactly as directed.  Do not skip doses or discontinue unless directed by your doctor.  May cause drowsiness.  Alcohol may intensify this effect.  Use care when operating dangerous machinery.  This prescription cannot be refilled.  Using more of this medication than prescribed may cause serious breathing problems.    -- Indication: For postop pain as needed    Ventolin  -- 2 puff(s) inhaled , As Needed  -- Indication: For home med

## 2018-07-16 NOTE — ASU DISCHARGE PLAN (ADULT/PEDIATRIC). - ACTIVITY LEVEL
nothing per vagina/no tub baths/no douching/weight bearing as tolerated/no tampons/no intercourse no tub baths/no swimming or hot tub for two weeks/weight bearing as tolerated/no tampons/no intercourse/nothing per vagina/no douching

## 2018-07-16 NOTE — BRIEF OPERATIVE NOTE - OPERATION/FINDINGS
~8cm anteverted uterus, grossly normal cavity, bilateral ostia identified.  LSC - omental adhesion to RLQ abdominal wall taken down without incident, sigmoid adhesion to posterior uterus.   No evidence of endometriosis. Hemostasis achieved.

## 2018-07-16 NOTE — ASU DISCHARGE PLAN (ADULT/PEDIATRIC). - SPECIAL INSTRUCTIONS
Regular diet as tolerated.  Resume normal activity as tolerated.  Please do not insert anything into vagina (sex, tampons, douching) for 2 weeks.     Take Motrin 600mg every 6 hrs as needed for mild to moderate pain. Take Percocet every 4 hours for moderate to severe pain. No heavy lifting, driving, or strenuous activity for 4 weeks. Call your doctor with any signs and symptoms of infection such as fever, chills, nausea or vomiting.  Call your doctor with redness or swelling at the incision site, fluid leakage or wound separation.  Call your doctor if you're unable to tolerate food or have difficulty urinating.  Call your doctor if you have pain that is not relieved by your prescribed medications.  Notify your doctor with any other concerns.    Follow up with Dr. Win in 2 weeks.

## 2018-07-16 NOTE — ASU DISCHARGE PLAN (ADULT/PEDIATRIC). - NURSING INSTRUCTIONS
You were given 1000mg IV Tylenol for pain management.  Please DO NOT take any Tylenol containing products, such as  Vicodin, Percocet, Excedrin, many cold preparations for the next 6 hours (until 315p).  DO NOT EXCEED 3000MG OF TYLENOL OVER 24 HOURS.

## 2018-07-16 NOTE — BRIEF OPERATIVE NOTE - PRE-OP DX
Fibroid  07/16/2018    Active  Yudi Dennis  Infertility, female  07/16/2018    Active  Yudi Dennis Fibroid  07/16/2018    Yudi Lamar  Infertility, female  07/16/2018    Yudi Lamar  Pelvic pain in female  07/16/2018    Yudi Lamar

## 2018-07-16 NOTE — BRIEF OPERATIVE NOTE - PROCEDURE
<<-----Click on this checkbox to enter Procedure Enterolysis, laparoscopic  07/16/2018    Active  [email or web address]  Diagnostic laparoscopy by gynecology  07/16/2018    Active  [email or web address]  Diagnostic hysteroscopy with dilation and curettage of uterus  07/16/2018    Active  [email or web address]

## 2018-07-31 ENCOUNTER — APPOINTMENT (OUTPATIENT)
Dept: OBGYN | Facility: CLINIC | Age: 38
End: 2018-07-31
Payer: COMMERCIAL

## 2018-07-31 VITALS
SYSTOLIC BLOOD PRESSURE: 132 MMHG | DIASTOLIC BLOOD PRESSURE: 88 MMHG | HEIGHT: 63.5 IN | HEART RATE: 76 BPM | BODY MASS INDEX: 31.32 KG/M2 | WEIGHT: 179 LBS

## 2018-07-31 DIAGNOSIS — N83.9 NONINFLAMMATORY DISORDER OF OVARY, FALLOPIAN TUBE AND BROAD LIGAMENT, UNSPECIFIED: ICD-10-CM

## 2018-07-31 DIAGNOSIS — Z02.89 ENCOUNTER FOR OTHER ADMINISTRATIVE EXAMINATIONS: ICD-10-CM

## 2018-07-31 DIAGNOSIS — Z86.018 PERSONAL HISTORY OF OTHER BENIGN NEOPLASM: ICD-10-CM

## 2018-07-31 DIAGNOSIS — Z01.818 ENCOUNTER FOR OTHER PREPROCEDURAL EXAMINATION: ICD-10-CM

## 2018-07-31 DIAGNOSIS — Z98.890 OTHER SPECIFIED POSTPROCEDURAL STATES: ICD-10-CM

## 2018-07-31 DIAGNOSIS — Z41.9 ENCOUNTER FOR PROCEDURE FOR PURPOSES OTHER THAN REMEDYING HEALTH STATE, UNSPECIFIED: ICD-10-CM

## 2018-07-31 PROCEDURE — 99024 POSTOP FOLLOW-UP VISIT: CPT

## 2018-07-31 RX ORDER — PREDNISONE 20 MG/1
20 TABLET ORAL
Qty: 18 | Refills: 0 | Status: COMPLETED | COMMUNITY
Start: 2018-06-21 | End: 2018-07-31

## 2018-07-31 RX ORDER — AMOXICILLIN 500 MG/1
500 CAPSULE ORAL
Qty: 20 | Refills: 0 | Status: COMPLETED | COMMUNITY
Start: 2018-06-21 | End: 2018-07-31

## 2018-07-31 RX ORDER — TRAMADOL HYDROCHLORIDE 50 MG/1
50 TABLET, COATED ORAL
Qty: 24 | Refills: 0 | Status: COMPLETED | COMMUNITY
Start: 2018-06-21 | End: 2018-07-31

## 2018-07-31 RX ORDER — OXYCODONE 5 MG/1
5 TABLET ORAL
Qty: 10 | Refills: 0 | Status: COMPLETED | COMMUNITY
Start: 2018-07-16 | End: 2018-07-31

## 2018-07-31 RX ORDER — POLYETHYLENE GLYCOL 3350, SODIUM CHLORIDE, SODIUM BICARBONATE, POTASSIUM CHLORIDE AND BISACODYL DELAYED-RELEASE TABLET 5 MG-210 G
5-210 KIT ORAL
Qty: 1 | Refills: 0 | Status: COMPLETED | COMMUNITY
Start: 2018-07-12 | End: 2018-07-31

## 2018-08-21 PROBLEM — N83.9 FALLOPIAN TUBE DISORDER: Status: RESOLVED | Noted: 2018-03-29 | Resolved: 2018-08-21

## 2018-08-21 PROBLEM — Z01.818 PREOPERATIVE EXAMINATION: Status: RESOLVED | Noted: 2018-07-03 | Resolved: 2018-08-21

## 2018-08-21 PROBLEM — Z98.890 POSTOPERATIVE STATE: Status: RESOLVED | Noted: 2018-08-21 | Resolved: 2018-08-21

## 2018-08-21 PROBLEM — Z86.018 HISTORY OF SUBMUCOUS MYOMA OF UTERUS: Status: RESOLVED | Noted: 2018-03-29 | Resolved: 2018-08-21

## 2018-08-21 PROBLEM — Z02.89 ENCOUNTER FOR COMPLETION OF FORM WITH PATIENT: Status: RESOLVED | Noted: 2018-07-03 | Resolved: 2018-08-21

## 2018-08-21 PROBLEM — Z41.9 SURGERY, ELECTIVE: Status: RESOLVED | Noted: 2018-07-12 | Resolved: 2018-08-21

## 2019-03-08 ENCOUNTER — APPOINTMENT (OUTPATIENT)
Dept: INTERNAL MEDICINE | Facility: CLINIC | Age: 39
End: 2019-03-08
Payer: COMMERCIAL

## 2019-03-08 VITALS
HEART RATE: 74 BPM | HEIGHT: 65 IN | BODY MASS INDEX: 27.66 KG/M2 | OXYGEN SATURATION: 98 % | SYSTOLIC BLOOD PRESSURE: 125 MMHG | DIASTOLIC BLOOD PRESSURE: 70 MMHG | WEIGHT: 166 LBS

## 2019-03-08 DIAGNOSIS — R11.10 VOMITING, UNSPECIFIED: ICD-10-CM

## 2019-03-08 PROBLEM — D25.9 LEIOMYOMA OF UTERUS, UNSPECIFIED: Chronic | Status: ACTIVE | Noted: 2018-07-05

## 2019-03-08 PROBLEM — J45.909 UNSPECIFIED ASTHMA, UNCOMPLICATED: Chronic | Status: ACTIVE | Noted: 2018-07-05

## 2019-03-08 PROBLEM — E55.9 VITAMIN D DEFICIENCY, UNSPECIFIED: Chronic | Status: ACTIVE | Noted: 2018-05-09

## 2019-03-08 PROCEDURE — 36415 COLL VENOUS BLD VENIPUNCTURE: CPT

## 2019-03-08 PROCEDURE — 99214 OFFICE O/P EST MOD 30 MIN: CPT | Mod: 25

## 2019-03-12 LAB
ALBUMIN SERPL ELPH-MCNC: 4.1 G/DL
ALP BLD-CCNC: 44 U/L
ALT SERPL-CCNC: 10 U/L
AMYLASE/CREAT SERPL: 79 U/L
ANION GAP SERPL CALC-SCNC: 11 MMOL/L
AST SERPL-CCNC: 12 U/L
BILIRUB SERPL-MCNC: 0.2 MG/DL
BUN SERPL-MCNC: 13 MG/DL
CALCIUM SERPL-MCNC: 9.7 MG/DL
CHLORIDE SERPL-SCNC: 105 MMOL/L
CO2 SERPL-SCNC: 25 MMOL/L
CREAT SERPL-MCNC: 0.78 MG/DL
GLUCOSE SERPL-MCNC: 82 MG/DL
HCG SERPL-MCNC: <1 MIU/ML
LEAD BLD-MCNC: 1 UG/DL
LPL SERPL-CCNC: 26 U/L
POTASSIUM SERPL-SCNC: 4.7 MMOL/L
PROT SERPL-MCNC: 6.9 G/DL
SODIUM SERPL-SCNC: 141 MMOL/L

## 2019-03-12 NOTE — ASSESSMENT
[FreeTextEntry1] : 38yo F here for abd pain. Pt wt hx of surgery to remove adhesions. The thought is karthikeyan the adhesions do not related to her current sx. \par \par abd pain - will consider more rare causes of abd pain. Pt wishes to have pancreas checked. \par          r/o pregnancy, send lead level\par          reassess\par           consider nerve block and altn pain management treatments.\par \par >50% of this 25 minute visit was spent in face-to-face time counseling patient on management of abdominal pain and vomitting.\par

## 2019-03-12 NOTE — HISTORY OF PRESENT ILLNESS
[FreeTextEntry8] : 40yo F with here for acute visit re abd pain.\par has episode of vomitting after eating\par triggered by smells\par even while eating - a sensation would come\par had an episode where she vomitted in the restaurant\par takes nsaid she takes often\par when she chews gum, it does not happen\par \par no blood in the vomit.\par occurs 2x/week, or once every other week, longstanding issue\par \par Remainder of ROS reviewed and found to be negative.\par \par

## 2019-03-12 NOTE — PHYSICAL EXAM
[Supple] : supple [Clear to Auscultation] : lungs were clear to auscultation bilaterally [No Accessory Muscle Use] : no accessory muscle use [Regular Rhythm] : with a regular rhythm [Normal S1, S2] : normal S1 and S2 [Soft] : abdomen soft [Non Tender] : non-tender [de-identified] : adult F NAD

## 2019-03-19 LAB
CORPROPORPHRYIN PLASMA: 0.6 MCG/L
DEPRECATED O AND P PREP STL: NORMAL
H PYLORI AG STL QL: NOT DETECTED
HEPTACARBOXYPORPHRYIN PLASMA: NORMAL MCG/L
HEXACARBOXYPORPHRYIN PLASMA: NORMAL MCG/L
PENTACARBOXYPORHPRYIN PLASMA: NORMAL MCG/L
PORPHRYINS PLASMA INTERP: NORMAL
PROTOPORPHRYIN PLASMA: NORMAL MCG/L
TOTAL PORPHRYINS: 0.6 MCG/L
UROPORPHRYIN PLASMA: NORMAL MCG/L

## 2019-04-01 ENCOUNTER — APPOINTMENT (OUTPATIENT)
Dept: GASTROENTEROLOGY | Facility: CLINIC | Age: 39
End: 2019-04-01
Payer: COMMERCIAL

## 2019-04-01 VITALS
RESPIRATION RATE: 14 BRPM | OXYGEN SATURATION: 96 % | BODY MASS INDEX: 26.68 KG/M2 | WEIGHT: 166 LBS | HEART RATE: 71 BPM | SYSTOLIC BLOOD PRESSURE: 121 MMHG | DIASTOLIC BLOOD PRESSURE: 85 MMHG | HEIGHT: 66 IN

## 2019-04-01 DIAGNOSIS — Z79.1 LONG TERM (CURRENT) USE OF NON-STEROIDAL ANTI-INFLAMMATORIES (NSAID): ICD-10-CM

## 2019-04-01 PROCEDURE — 99204 OFFICE O/P NEW MOD 45 MIN: CPT

## 2019-04-02 PROBLEM — Z79.1 NSAID LONG-TERM USE: Status: ACTIVE | Noted: 2019-04-02

## 2019-04-02 NOTE — PHYSICAL EXAM
[General Appearance - Alert] : alert [General Appearance - In No Acute Distress] : in no acute distress [Sclera] : the sclera and conjunctiva were normal [Extraocular Movements] : extraocular movements were intact [Outer Ear] : the ears and nose were normal in appearance [Hearing Threshold Finger Rub Not [place]] : hearing was normal [Neck Appearance] : the appearance of the neck was normal [Neck Cervical Mass (___cm)] : no neck mass was observed [Auscultation Breath Sounds / Voice Sounds] : lungs were clear to auscultation bilaterally [Heart Rate And Rhythm] : heart rate was normal and rhythm regular [Heart Sounds] : normal S1 and S2 [Heart Sounds Gallop] : no gallops [Murmurs] : no murmurs [Heart Sounds Pericardial Friction Rub] : no pericardial rub [Cervical Lymph Nodes Enlarged Posterior Bilaterally] : posterior cervical [Cervical Lymph Nodes Enlarged Anterior Bilaterally] : anterior cervical [Supraclavicular Lymph Nodes Enlarged Bilaterally] : supraclavicular [Abnormal Walk] : normal gait [Nail Clubbing] : no clubbing  or cyanosis of the fingernails [Skin Color & Pigmentation] : normal skin color and pigmentation [] : no rash [Oriented To Time, Place, And Person] : oriented to person, place, and time [Impaired Insight] : insight and judgment were intact [Affect] : the affect was normal [FreeTextEntry1] : Lower abdominal tenderness

## 2019-04-02 NOTE — HISTORY OF PRESENT ILLNESS
[de-identified] : 39-year-old woman s/p appendectomy, s/p myomectomy s/p lysis of adhesions in July of 2018 who presents with complaints of lower abdominal pain. \par \par Patient reports she had myomectomy in 2011.   Several months after myomectomy she started having right lower abdominal pain which can radiate to the right leg and sometimes to her back and her rectum. The pain can be very intense lasting for several hours. In fact she showed me a video of her in pain. She has gone to several emergency rooms for this pain. She's had a workup for this pain including a colonoscopy in 2015 which was unremarkable except for hemorrhoids. She had an MRI of the abdomen and pelvis a year ago that was unremarkable - I don't have the report.  \par \par She underwent lysis of adhesions in July 2018.  She still continues to have the pain -pain occurs several days after the start of her menstrual period and can last for several weeks. She may have one week relief of the pain.\par \par She has tried hormonal therapy for possible endometriosis but continues to have discomfort.\par \par She also feels nauseous and at times she will vomit.  She takes NSAIDs for menstrual cramps and for the pain.\par \par She says she feels constipated at times but has a bowel movement once a day or every other day.  She denies diarrhea.  When she strains she will see a little red blood in the stool.\par \par She says uncle had prostate cancer but denies any gastrointestinal cancers in the family.\par \par She's never had an upper endoscopy.\par \par All other review of systems are negative.  Denies cardiac symptoms.\par \par

## 2019-04-02 NOTE — ASSESSMENT
[FreeTextEntry1] : 39-year-old woman s/p appendectomy, s/p myomectomy s/p lysis of adhesions in July of 2018 who presents with complaints of right lower abdominal pain.  Unclear cause of lower abdominal pain.  \par \par We discussed possibility of repeating her colonoscopy with intubation of the terminal ileum however she wants to hold off.\par \par We discussed the possibility of an upper endoscopy since she is nauseous and vomiting to rule out peptic ulcer disease - she takes NSAIDs. The risks of the procedure and anesthesia were discussed with the patient she wants to proceed.\par \par She may have nerve injury as a cause of the severe intense pain. She will see her pain specialist and follow up with GYN.\par \par Will hold off on imaging since she's had imaging already for this pain - will obtain imaging results. \par \par Note was dictated with patient in room.\par \par

## 2019-04-29 ENCOUNTER — TRANSCRIPTION ENCOUNTER (OUTPATIENT)
Age: 39
End: 2019-04-29

## 2019-04-30 ENCOUNTER — OUTPATIENT (OUTPATIENT)
Dept: OUTPATIENT SERVICES | Facility: HOSPITAL | Age: 39
LOS: 1 days | End: 2019-04-30
Payer: COMMERCIAL

## 2019-04-30 ENCOUNTER — APPOINTMENT (OUTPATIENT)
Dept: GASTROENTEROLOGY | Facility: HOSPITAL | Age: 39
End: 2019-04-30

## 2019-04-30 ENCOUNTER — RESULT REVIEW (OUTPATIENT)
Age: 39
End: 2019-04-30

## 2019-04-30 DIAGNOSIS — Z90.49 ACQUIRED ABSENCE OF OTHER SPECIFIED PARTS OF DIGESTIVE TRACT: Chronic | ICD-10-CM

## 2019-04-30 DIAGNOSIS — Z98.890 OTHER SPECIFIED POSTPROCEDURAL STATES: Chronic | ICD-10-CM

## 2019-04-30 DIAGNOSIS — R11.2 NAUSEA WITH VOMITING, UNSPECIFIED: ICD-10-CM

## 2019-04-30 DIAGNOSIS — R10.31 RIGHT LOWER QUADRANT PAIN: ICD-10-CM

## 2019-04-30 LAB — HCG UR QL: NEGATIVE — SIGNIFICANT CHANGE UP

## 2019-04-30 PROCEDURE — 88312 SPECIAL STAINS GROUP 1: CPT | Mod: 26

## 2019-04-30 PROCEDURE — 45380 COLONOSCOPY AND BIOPSY: CPT

## 2019-04-30 PROCEDURE — 88305 TISSUE EXAM BY PATHOLOGIST: CPT

## 2019-04-30 PROCEDURE — 81025 URINE PREGNANCY TEST: CPT

## 2019-04-30 PROCEDURE — 43239 EGD BIOPSY SINGLE/MULTIPLE: CPT

## 2019-04-30 PROCEDURE — 88312 SPECIAL STAINS GROUP 1: CPT

## 2019-04-30 PROCEDURE — 88305 TISSUE EXAM BY PATHOLOGIST: CPT | Mod: 26

## 2021-02-23 ENCOUNTER — APPOINTMENT (OUTPATIENT)
Dept: INTERNAL MEDICINE | Facility: CLINIC | Age: 41
End: 2021-02-23
Payer: MEDICAID

## 2021-02-23 VITALS
BODY MASS INDEX: 29.57 KG/M2 | TEMPERATURE: 98.6 F | SYSTOLIC BLOOD PRESSURE: 142 MMHG | HEIGHT: 66 IN | DIASTOLIC BLOOD PRESSURE: 80 MMHG | OXYGEN SATURATION: 97 % | HEART RATE: 78 BPM | WEIGHT: 184 LBS

## 2021-02-23 DIAGNOSIS — Z11.59 ENCOUNTER FOR SCREENING FOR OTHER VIRAL DISEASES: ICD-10-CM

## 2021-02-23 PROCEDURE — 99072 ADDL SUPL MATRL&STAF TM PHE: CPT

## 2021-02-23 PROCEDURE — 99396 PREV VISIT EST AGE 40-64: CPT | Mod: 25

## 2021-02-25 DIAGNOSIS — E55.9 VITAMIN D DEFICIENCY, UNSPECIFIED: ICD-10-CM

## 2021-02-25 LAB
25(OH)D3 SERPL-MCNC: 12.1 NG/ML
ALBUMIN SERPL ELPH-MCNC: 4.3 G/DL
ALP BLD-CCNC: 53 U/L
ALT SERPL-CCNC: 19 U/L
ANION GAP SERPL CALC-SCNC: 13 MMOL/L
AST SERPL-CCNC: 19 U/L
BASOPHILS # BLD AUTO: 0.05 K/UL
BASOPHILS NFR BLD AUTO: 0.8 %
BILIRUB SERPL-MCNC: 0.3 MG/DL
BUN SERPL-MCNC: 16 MG/DL
CALCIUM SERPL-MCNC: 10 MG/DL
CHLORIDE SERPL-SCNC: 101 MMOL/L
CHOLEST SERPL-MCNC: 199 MG/DL
CO2 SERPL-SCNC: 24 MMOL/L
CREAT SERPL-MCNC: 0.96 MG/DL
EOSINOPHIL # BLD AUTO: 0.41 K/UL
EOSINOPHIL NFR BLD AUTO: 6.3 %
ESTIMATED AVERAGE GLUCOSE: 114 MG/DL
GLUCOSE SERPL-MCNC: 77 MG/DL
HBA1C MFR BLD HPLC: 5.6 %
HCT VFR BLD CALC: 42.8 %
HDLC SERPL-MCNC: 59 MG/DL
HGB BLD-MCNC: 13 G/DL
IMM GRANULOCYTES NFR BLD AUTO: 0.2 %
LDLC SERPL CALC-MCNC: 123 MG/DL
LYMPHOCYTES # BLD AUTO: 2.7 K/UL
LYMPHOCYTES NFR BLD AUTO: 41.5 %
MAN DIFF?: NORMAL
MCHC RBC-ENTMCNC: 27 PG
MCHC RBC-ENTMCNC: 30.4 GM/DL
MCV RBC AUTO: 88.8 FL
MONOCYTES # BLD AUTO: 0.5 K/UL
MONOCYTES NFR BLD AUTO: 7.7 %
NEUTROPHILS # BLD AUTO: 2.83 K/UL
NEUTROPHILS NFR BLD AUTO: 43.5 %
NONHDLC SERPL-MCNC: 140 MG/DL
PLATELET # BLD AUTO: 281 K/UL
POTASSIUM SERPL-SCNC: 3.8 MMOL/L
PROT SERPL-MCNC: 7.4 G/DL
RBC # BLD: 4.82 M/UL
RBC # FLD: 13.2 %
SARS-COV-2 IGG SERPL IA-ACNC: 0.09 INDEX
SARS-COV-2 IGG SERPL QL IA: NEGATIVE
SODIUM SERPL-SCNC: 138 MMOL/L
TRIGL SERPL-MCNC: 86 MG/DL
TSH SERPL-ACNC: 0.84 UIU/ML
WBC # FLD AUTO: 6.5 K/UL

## 2021-03-07 NOTE — ASSESSMENT
[FreeTextEntry1] : 40yo F with pMH of pelvic /abd pain\par \par Lengthy discussion regarding treating pain versus looking into the cause\par she wishes to see another surgeon but I advised against it bc its not clear she needs surgical intervention\par has tried gabapentin and did not wish to revisit\par \par I offered T#3 at night\par Advised to alternate with aleve and tylenol so not to take too much of either one in a day\par will plan for pain consult as well and follow up with Dr. Win for suggestions for eval by subspecialists, if needed\par \par \par HCM - cpe today, labs today, sees gyn

## 2021-03-07 NOTE — HISTORY OF PRESENT ILLNESS
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 Ryan Win and had a laparoscopy - no endometriosis\par \par She is pain free for on 2 week out of the month \par Pain starts on day 9 or 10 of her cycle and persists for almost 2-3 weeks.\par takes tylenol and aleve - concerned she is taking too much pain\par \par asthma - mild -- reacts to animal fur - and cold weather -- has  ventolin - needs new script\par also take montelukast and Q nasl - needs refills on both

## 2021-03-24 ENCOUNTER — NON-APPOINTMENT (OUTPATIENT)
Age: 41
End: 2021-03-24

## 2021-04-19 ENCOUNTER — NON-APPOINTMENT (OUTPATIENT)
Age: 41
End: 2021-04-19

## 2021-05-10 ENCOUNTER — NON-APPOINTMENT (OUTPATIENT)
Age: 41
End: 2021-05-10

## 2021-05-13 RX ORDER — ERGOCALCIFEROL 1.25 MG/1
1.25 MG CAPSULE, LIQUID FILLED ORAL
Qty: 8 | Refills: 0 | Status: ACTIVE | COMMUNITY
Start: 2021-02-25 | End: 1900-01-01

## 2021-05-24 ENCOUNTER — NON-APPOINTMENT (OUTPATIENT)
Age: 41
End: 2021-05-24

## 2021-06-01 ENCOUNTER — NON-APPOINTMENT (OUTPATIENT)
Age: 41
End: 2021-06-01

## 2021-06-01 ENCOUNTER — APPOINTMENT (OUTPATIENT)
Dept: INTERNAL MEDICINE | Facility: CLINIC | Age: 41
End: 2021-06-01

## 2021-06-15 ENCOUNTER — NON-APPOINTMENT (OUTPATIENT)
Age: 41
End: 2021-06-15

## 2021-06-15 ENCOUNTER — APPOINTMENT (OUTPATIENT)
Dept: INTERNAL MEDICINE | Facility: CLINIC | Age: 41
End: 2021-06-15
Payer: MEDICAID

## 2021-06-15 VITALS
HEART RATE: 84 BPM | WEIGHT: 189 LBS | OXYGEN SATURATION: 98 % | TEMPERATURE: 98.6 F | DIASTOLIC BLOOD PRESSURE: 86 MMHG | SYSTOLIC BLOOD PRESSURE: 132 MMHG | BODY MASS INDEX: 30.51 KG/M2

## 2021-06-15 DIAGNOSIS — K59.00 CONSTIPATION, UNSPECIFIED: ICD-10-CM

## 2021-06-15 PROCEDURE — 99213 OFFICE O/P EST LOW 20 MIN: CPT

## 2021-06-30 NOTE — PHYSICAL EXAM
[PERRL] : pupils equal round and reactive to light [EOMI] : extraocular movements intact [Normal Oropharynx] : the oropharynx was normal [No Lymphadenopathy] : no lymphadenopathy [No Accessory Muscle Use] : no accessory muscle use [Clear to Auscultation] : lungs were clear to auscultation bilaterally [Regular Rhythm] : with a regular rhythm [Normal S1, S2] : normal S1 and S2 [Soft] : abdomen soft [Non Tender] : non-tender [de-identified] : adult F NAD

## 2021-06-30 NOTE — ASSESSMENT
[FreeTextEntry1] : 42yo F seen in follow up. Has not seen GYN or GI.\par \par Abd pain - rec she revisit GYN, hold of on imaging for now\par \par constipation - lifestyle\par \par asthma - cont meds

## 2021-06-30 NOTE — HISTORY OF PRESENT ILLNESS
[FreeTextEntry1] : abd pain [de-identified] : 42yo F seen for follow up has had abd pain \par was getting infertility tx but now on Medicaid as she is in school FTR\par \par Years ago -- had a surgery - fibrinogenic was affected and [facility] fresh frozen [facility]  [facility]\par \par menses started 6/10/2021 - pain goes away with menses - would recur after\par was hving constipation with med - now takes medication to prevent it

## 2021-08-26 ENCOUNTER — NON-APPOINTMENT (OUTPATIENT)
Age: 41
End: 2021-08-26

## 2021-09-01 ENCOUNTER — RX RENEWAL (OUTPATIENT)
Age: 41
End: 2021-09-01

## 2021-09-14 ENCOUNTER — LABORATORY RESULT (OUTPATIENT)
Age: 41
End: 2021-09-14

## 2021-09-14 ENCOUNTER — APPOINTMENT (OUTPATIENT)
Dept: INTERNAL MEDICINE | Facility: CLINIC | Age: 41
End: 2021-09-14
Payer: MEDICAID

## 2021-09-14 ENCOUNTER — APPOINTMENT (OUTPATIENT)
Dept: RHEUMATOLOGY | Facility: CLINIC | Age: 41
End: 2021-09-14
Payer: MEDICAID

## 2021-09-14 VITALS
SYSTOLIC BLOOD PRESSURE: 146 MMHG | OXYGEN SATURATION: 97 % | BODY MASS INDEX: 30.34 KG/M2 | HEART RATE: 78 BPM | DIASTOLIC BLOOD PRESSURE: 97 MMHG | WEIGHT: 188 LBS

## 2021-09-14 VITALS
TEMPERATURE: 98.2 F | WEIGHT: 188 LBS | DIASTOLIC BLOOD PRESSURE: 80 MMHG | HEART RATE: 85 BPM | SYSTOLIC BLOOD PRESSURE: 140 MMHG | HEIGHT: 66 IN | OXYGEN SATURATION: 98 % | BODY MASS INDEX: 30.22 KG/M2

## 2021-09-14 PROCEDURE — 99215 OFFICE O/P EST HI 40 MIN: CPT

## 2021-09-14 PROCEDURE — 99204 OFFICE O/P NEW MOD 45 MIN: CPT
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
## 2021-09-14 NOTE — HISTORY OF PRESENT ILLNESS
[FreeTextEntry1] : 42 y/o female w/ asthma, Hx of appendectomy, myomectomy referred to rheumatology for evaluation for autoimmune disease. \par Pt has had episodes of joint pains and other symptoms which is recurrent. Pt states that her current episode in the last 3 weeks has been terrible so came to see rheumatologist.\par Patient has pains in the chest, L wrist, flanks, frequent urination, subjective fever, chills. Pt feels like these symptoms are "inflammatory". Reports sensitive skin. Pt had skin allergen testing in past with some positive allergens. Pt had an episode of pustular appearing rash of R shoulder.\par The episodes are worse when she is not on her period and improves with impending period.\par Pt states that her symptoms somewhat improved with montelukast. Pt only takes Tylenol for the pain.\par Patient had an episode of DIC related to her myomectomy, pending follow up with hematology.\par \par Labs 2/2021: Vit D 25-OH 12.1. Normal/eng CMP, CBC, TSH, HgbA1C\par \par

## 2021-09-14 NOTE — ASSESSMENT
[FreeTextEntry1] : 42 y/o female w/ asthma, Hx of appendectomy, myomectomy referred to rheumatology for evaluation for autoimmune disease.\par Patient has episodes of joint pains (worst in L wrist, no clear swelling), chest pain, flank pains, frequent urination, subjective fever, chills, which are worse when she is not on her period. Patient has had these episodes for many years but the most recent episode was terrible so comes to rheum for evaluation. Symptoms somewhat improved with montelukast. Patient had an episode of DIC related to her myomectomy, pending follow up with hematology.\par Exam without synovitis, tenderness, rash.\par Previous labs with unremarkable\par \par Patient has various non-specific symptoms which are recurrent. DDx includes infection, inflammatory disease, endocrinologic abnormalities.\par \par - Will obtain comprehensive bloodwork to evaluate for any underlying CTD, other causes of fatigue,subjective fevers\par - RTC in 1 month to discuss results.

## 2021-09-14 NOTE — ASSESSMENT
[FreeTextEntry1] : \par \par abd pain/pelvic pain - has had eval by GYN and GI in the past - no clear cource; related to menses, unclear if pt has hx of trauma -- will reefer to tele pain again\par \par asthma - prn inhaler\par \par infertiliry - seekign heme clearnace and plans to return\par \par joint pain - seeing rheumatology\par \par hx of nasal surgery - changed dose of qnasal\par \par declined flu shot today\par \par elevated BP w/o dx of htn - sbp 140 - elevated/on border in the past - ghiven age and plan to seek fertility tx - will consider ambulatory BP monitoring in the future\par HCM - , low vit D, sees GYN

## 2021-09-14 NOTE — PHYSICAL EXAM
[EOMI] : extraocular movements intact [Supple] : supple [No Accessory Muscle Use] : no accessory muscle use [Clear to Auscultation] : lungs were clear to auscultation bilaterally [Regular Rhythm] : with a regular rhythm [Normal S1, S2] : normal S1 and S2 [de-identified] : adult F, NAD

## 2021-09-15 LAB
25(OH)D3 SERPL-MCNC: 29.8 NG/ML
ALBUMIN SERPL ELPH-MCNC: 4.2 G/DL
ALP BLD-CCNC: 59 U/L
ALT SERPL-CCNC: 13 U/L
ANION GAP SERPL CALC-SCNC: 12 MMOL/L
AST SERPL-CCNC: 18 U/L
BASOPHILS # BLD AUTO: 0.05 K/UL
BASOPHILS NFR BLD AUTO: 0.7 %
BILIRUB SERPL-MCNC: 0.2 MG/DL
BUN SERPL-MCNC: 11 MG/DL
CALCIUM SERPL-MCNC: 9.6 MG/DL
CHLORIDE SERPL-SCNC: 103 MMOL/L
CK SERPL-CCNC: 103 U/L
CO2 SERPL-SCNC: 24 MMOL/L
CREAT SERPL-MCNC: 0.95 MG/DL
CREAT SPEC-SCNC: 276 MG/DL
CREAT/PROT UR: 0.1 RATIO
CRP SERPL-MCNC: 4 MG/L
EOSINOPHIL # BLD AUTO: 0.13 K/UL
EOSINOPHIL NFR BLD AUTO: 1.7 %
ERYTHROCYTE [SEDIMENTATION RATE] IN BLOOD BY WESTERGREN METHOD: 54 MM/HR
FERRITIN SERPL-MCNC: 47 NG/ML
FOLATE SERPL-MCNC: >20 NG/ML
GLUCOSE SERPL-MCNC: 90 MG/DL
HCT VFR BLD CALC: 41.5 %
HGB BLD-MCNC: 12.3 G/DL
IMM GRANULOCYTES NFR BLD AUTO: 0.1 %
IRON SATN MFR SERPL: 12 %
IRON SERPL-MCNC: 44 UG/DL
LDH SERPL-CCNC: 165 U/L
LYMPHOCYTES # BLD AUTO: 2.25 K/UL
LYMPHOCYTES NFR BLD AUTO: 30 %
MAN DIFF?: NORMAL
MCHC RBC-ENTMCNC: 25.9 PG
MCHC RBC-ENTMCNC: 29.6 GM/DL
MCV RBC AUTO: 87.4 FL
MONOCYTES # BLD AUTO: 0.6 K/UL
MONOCYTES NFR BLD AUTO: 8 %
NEUTROPHILS # BLD AUTO: 4.45 K/UL
NEUTROPHILS NFR BLD AUTO: 59.5 %
PLATELET # BLD AUTO: 324 K/UL
POTASSIUM SERPL-SCNC: 4.7 MMOL/L
PROT SERPL-MCNC: 7.5 G/DL
PROT UR-MCNC: 35 MG/DL
RBC # BLD: 4.75 M/UL
RBC # FLD: 14.1 %
SODIUM SERPL-SCNC: 140 MMOL/L
TIBC SERPL-MCNC: 359 UG/DL
TSH SERPL-ACNC: 2.58 UIU/ML
UIBC SERPL-MCNC: 315 UG/DL
VIT B12 SERPL-MCNC: 1435 PG/ML
WBC # FLD AUTO: 7.49 K/UL

## 2021-09-16 ENCOUNTER — OUTPATIENT (OUTPATIENT)
Dept: OUTPATIENT SERVICES | Facility: HOSPITAL | Age: 41
LOS: 1 days | Discharge: HOME | End: 2021-09-16

## 2021-09-16 DIAGNOSIS — Z98.890 OTHER SPECIFIED POSTPROCEDURAL STATES: Chronic | ICD-10-CM

## 2021-09-16 DIAGNOSIS — Z90.49 ACQUIRED ABSENCE OF OTHER SPECIFIED PARTS OF DIGESTIVE TRACT: Chronic | ICD-10-CM

## 2021-09-16 DIAGNOSIS — Z01.818 ENCOUNTER FOR OTHER PREPROCEDURAL EXAMINATION: ICD-10-CM

## 2021-09-16 LAB
ACE BLD-CCNC: 32 U/L
ALDOLASE SERPL-CCNC: 4.7 U/L
APPEARANCE: ABNORMAL
B BURGDOR IGG+IGM SER QL IB: NORMAL
BILIRUBIN URINE: NEGATIVE
BLOOD URINE: NEGATIVE
COLOR: YELLOW
DEPRECATED KAPPA LC FREE/LAMBDA SER: 0.82 RATIO
GLUCOSE QUALITATIVE U: NEGATIVE
HBV CORE IGG+IGM SER QL: NONREACTIVE
HBV SURFACE AB SER QL: REACTIVE
HBV SURFACE AG SER QL: NORMAL
HCV AB SER QL: NONREACTIVE
HCV S/CO RATIO: 0.16 S/CO
HETEROPH AB SER QL: NEGATIVE
KAPPA LC CSF-MCNC: 1.04 MG/DL
KAPPA LC SERPL-MCNC: 0.85 MG/DL
KETONES URINE: NEGATIVE
LEUKOCYTE ESTERASE URINE: NEGATIVE
MPO AB + PR3 PNL SER: NORMAL
NITRITE URINE: NEGATIVE
PH URINE: 6
PROTEIN URINE: NORMAL
SPECIFIC GRAVITY URINE: 1.02
UROBILINOGEN URINE: NORMAL

## 2021-09-17 ENCOUNTER — RESULT REVIEW (OUTPATIENT)
Age: 41
End: 2021-09-17

## 2021-09-17 ENCOUNTER — LABORATORY RESULT (OUTPATIENT)
Age: 41
End: 2021-09-17

## 2021-09-17 ENCOUNTER — RX RENEWAL (OUTPATIENT)
Age: 41
End: 2021-09-17

## 2021-09-17 ENCOUNTER — APPOINTMENT (OUTPATIENT)
Dept: HEMATOLOGY ONCOLOGY | Facility: CLINIC | Age: 41
End: 2021-09-17
Payer: MEDICAID

## 2021-09-17 VITALS
SYSTOLIC BLOOD PRESSURE: 143 MMHG | TEMPERATURE: 97.2 F | OXYGEN SATURATION: 98 % | HEIGHT: 64.69 IN | WEIGHT: 189.58 LBS | RESPIRATION RATE: 16 BRPM | BODY MASS INDEX: 31.97 KG/M2 | HEART RATE: 91 BPM | DIASTOLIC BLOOD PRESSURE: 93 MMHG

## 2021-09-17 LAB
BASOPHILS # BLD AUTO: 0.04 K/UL — SIGNIFICANT CHANGE UP (ref 0–0.2)
BASOPHILS NFR BLD AUTO: 0.5 % — SIGNIFICANT CHANGE UP (ref 0–2)
EOSINOPHIL # BLD AUTO: 0.21 K/UL — SIGNIFICANT CHANGE UP (ref 0–0.5)
EOSINOPHIL NFR BLD AUTO: 2.8 % — SIGNIFICANT CHANGE UP (ref 0–6)
HCT VFR BLD CALC: 38 % — SIGNIFICANT CHANGE UP (ref 34.5–45)
HGB BLD-MCNC: 11.9 G/DL — SIGNIFICANT CHANGE UP (ref 11.5–15.5)
IMM GRANULOCYTES NFR BLD AUTO: 0.3 % — SIGNIFICANT CHANGE UP (ref 0–1.5)
LYMPHOCYTES # BLD AUTO: 2.39 K/UL — SIGNIFICANT CHANGE UP (ref 1–3.3)
LYMPHOCYTES # BLD AUTO: 32 % — SIGNIFICANT CHANGE UP (ref 13–44)
M TB IFN-G BLD-IMP: NEGATIVE
MCHC RBC-ENTMCNC: 26.6 PG — LOW (ref 27–34)
MCHC RBC-ENTMCNC: 31.3 G/DL — LOW (ref 32–36)
MCV RBC AUTO: 84.8 FL — SIGNIFICANT CHANGE UP (ref 80–100)
MONOCYTES # BLD AUTO: 0.62 K/UL — SIGNIFICANT CHANGE UP (ref 0–0.9)
MONOCYTES NFR BLD AUTO: 8.3 % — SIGNIFICANT CHANGE UP (ref 2–14)
NEUTROPHILS # BLD AUTO: 4.19 K/UL — SIGNIFICANT CHANGE UP (ref 1.8–7.4)
NEUTROPHILS NFR BLD AUTO: 56.1 % — SIGNIFICANT CHANGE UP (ref 43–77)
NRBC # BLD: 0 /100 WBCS — SIGNIFICANT CHANGE UP (ref 0–0)
PLATELET # BLD AUTO: 274 K/UL — SIGNIFICANT CHANGE UP (ref 150–400)
QUANTIFERON TB PLUS MITOGEN MINUS NIL: 8.72 IU/ML
QUANTIFERON TB PLUS NIL: 0.02 IU/ML
QUANTIFERON TB PLUS TB1 MINUS NIL: 0 IU/ML
QUANTIFERON TB PLUS TB2 MINUS NIL: 0 IU/ML
RBC # BLD: 4.48 M/UL — SIGNIFICANT CHANGE UP (ref 3.8–5.2)
RBC # FLD: 13.8 % — SIGNIFICANT CHANGE UP (ref 10.3–14.5)
WBC # BLD: 7.47 K/UL — SIGNIFICANT CHANGE UP (ref 3.8–10.5)
WBC # FLD AUTO: 7.47 K/UL — SIGNIFICANT CHANGE UP (ref 3.8–10.5)

## 2021-09-17 PROCEDURE — 99204 OFFICE O/P NEW MOD 45 MIN: CPT

## 2021-09-20 LAB
ALBUMIN MFR SERPL ELPH: 53.8 %
ALBUMIN SERPL-MCNC: 4 G/DL
ALBUMIN/GLOB SERPL: 1.1 RATIO
ALPHA1 GLOB MFR SERPL ELPH: 4.3 %
ALPHA1 GLOB SERPL ELPH-MCNC: 0.3 G/DL
ALPHA2 GLOB MFR SERPL ELPH: 9.8 %
ALPHA2 GLOB SERPL ELPH-MCNC: 0.7 G/DL
B-GLOBULIN MFR SERPL ELPH: 13.5 %
B-GLOBULIN SERPL ELPH-MCNC: 1 G/DL
GAMMA GLOB FLD ELPH-MCNC: 1.4 G/DL
GAMMA GLOB MFR SERPL ELPH: 18.6 %
INTERPRETATION SERPL IEP-IMP: NORMAL
M PROTEIN SPEC IFE-MCNC: NORMAL
PROT SERPL-MCNC: 7.5 G/DL
PROT SERPL-MCNC: 7.5 G/DL

## 2021-09-26 LAB
CREAT 24H UR-MCNC: NORMAL G/24 H
CREATININE UR (MAYO): 286 MG/DL
KAPPA LC 24H UR QL: NORMAL
MISCELLANEOUS TEST: NORMAL
PROC NAME: NORMAL
PROT UR-MCNC: 16 MG/DL
PROT UR-MCNC: 16 MG/DL

## 2021-09-28 LAB
APTT BLD: 39 SEC
FACT VIII ACT/NOR PPP: 92 %
FACTOR XIII ACTIVITY: 133 % ACTIV.
FIBRINOGEN PPP COAG.DERIVED-MCNC: 633 MG/DL
FSP TITR PPP LA: < 5 UG/ML
INR PPP: 1.07 RATIO
PT BLD: 12.6 SEC
VWF AG PPP IA-ACNC: 115 %
VWF:RCO ACT/NOR PPP PL AGG: 83 %

## 2021-09-28 NOTE — ASSESSMENT
[FreeTextEntry1] : This is a 41 year old woman,   Patient seen by Dr. Fuentes in 2018 for bleeding following a fibroid removal at [place]. Recieved 2 units FFP and 2 units PRBC. Fibrinogen in the 70's DIC effect was expected to be found.  \par   had a right breast fibroadenoma removed without incident.  Patient had no family history of thrombophilia.  \par On workup platelet aggregation normal, fibrinogen 541mg/dl   Prior to that she did also have an episode of bleeding without comment on the DIC.  Workup at the time was significant flow a low Factor XIII.  Other workup for hypercoagulable states and bleeding disorders negative.  \par \par Explained to patient that she should not be receiving the J&J vaccine as that is associated with VITT which is not identical to, but follows a similar thrombocytopenic and thrombotic process as the DIC she experienced.  The Pfizer and Moderna vaccine are more preferable. While they do carry hematologic side effects such as ITP, the risk is low.  \par \par Patient is contemplating fertility treatment, possibly an IUI first rather than IVF.  Asked her to return in 6 months for follow up or sooner so we can give recommendations regarding hematogic optimization prior to IUI\par \par Would repeat the Factor XIII leval since this was low at one point, and repeat PT/PTT, Fibrinogen assays rule out current DIC episode.  \par \par Addendum 21\par Called patient re: bloodwork,  Factor XIII activity 121%  Fibrinogen normal, no evidence of active DIC effect. PTT modestly elevated 39.  This is not a contraindication to receiving the COVID 19 vaccines.  Recommend avoiding the J&J for more of the vaccine and reccomended the Pfizer vaccine given history of consumptive coagulopathy.

## 2021-09-28 NOTE — HISTORY OF PRESENT ILLNESS
[de-identified] : This is a 41 year old woman,   Patient seen by Dr. Fuentes in 2018 for bleeding following a fibroid removal at [place]. Recieved 2 units FFP and 2 units PRBC. Fibrinogen in the 70's DIC effect was expected to be found.  \par   had a right breast fibroadenoma removed without incident.  Patient had no family history of thrombophilia.  \par On workup platelet aggregation normal, fibrinogen 541mg/dl   Prior to that she did also have an episode of bleeding without comment on the DIC.  \par FSP < 5  \par Factor XIII 50% Repeat a week later 167% \par Plasminogen acitvity 118% \par A2 antiplasmin 106% \par APTT 37.4 sec PT 12.1 sec \par QUINCY activity < 4 \par VWF ACT 76% \par VWF % \par Reptilase time 20.1 sec \par Patient is contemplating fertility treatment, possibly an IUI first rather than IVF.  \par Patient has never had a history of VTE though she was concerned of this.  \par Never had covid, unvaccinated but wanted confirmation that the Vaccines are safe with the history of DIC.

## 2021-10-06 ENCOUNTER — APPOINTMENT (OUTPATIENT)
Dept: RHEUMATOLOGY | Facility: CLINIC | Age: 41
End: 2021-10-06

## 2021-10-09 ENCOUNTER — APPOINTMENT (OUTPATIENT)
Dept: DISASTER EMERGENCY | Facility: OTHER | Age: 41
End: 2021-10-09
Payer: MEDICAID

## 2021-10-09 PROCEDURE — 0001A: CPT

## 2021-10-19 ENCOUNTER — NON-APPOINTMENT (OUTPATIENT)
Age: 41
End: 2021-10-19

## 2021-10-25 ENCOUNTER — APPOINTMENT (OUTPATIENT)
Dept: PEDIATRIC ALLERGY IMMUNOLOGY | Facility: CLINIC | Age: 41
End: 2021-10-25
Payer: MEDICAID

## 2021-10-25 VITALS — WEIGHT: 189 LBS | HEIGHT: 66 IN | BODY MASS INDEX: 30.37 KG/M2 | TEMPERATURE: 98.1 F

## 2021-10-25 DIAGNOSIS — T50.B95A ADVERSE EFFECT OF OTHER VIRAL VACCINES, INITIAL ENCOUNTER: ICD-10-CM

## 2021-10-25 PROCEDURE — 99203 OFFICE O/P NEW LOW 30 MIN: CPT

## 2021-10-25 NOTE — HISTORY OF PRESENT ILLNESS
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  She did not need any meds for rash and it went away on its own. The rash by hx did not appear to be urticarial.

## 2021-10-25 NOTE — ASSESSMENT
[FreeTextEntry1] : 41 yr old with history mild papular rash 72 hrs after Pfizer COVID vaccine #1 - likely mild hypersensitive response to immune stimulus from vaccine\par \par Many of these reaction never re-appear upon second vaccination and this reaction was not anaphylactic.  As such there is no contraindication to receiving her second COVID vaccine\par \par She may take Zyrtec 10 mg on the day of the vaccine and for three days after to help with itching\par \par Total MD time spent on this encounter was 35 minutes.  This includes time devoted to preparing to see the patient with review of previous medical record, obtaining medical history, performing physical exam, counseling and patient education with patient and family, ordering medications and lab studies, documentation in the medical record and coordination of care.\par

## 2021-10-25 NOTE — REASON FOR VISIT
[Initial Evaluation] : an initial evaluation of [Allergy Evaluation/ Skin Testing] : allergy evaluation and or skin testing [To Medication] : allergy to medication

## 2021-10-25 NOTE — PHYSICAL EXAM
[Alert] : alert [Well Nourished] : well nourished [No Discharge] : no discharge [Normal TMs] : both tympanic membranes were normal [No Thrush] : no thrush [Boggy Nasal Turbinates] : no boggy and/or pale nasal turbinates [Posterior Pharyngeal Cobblestoning] : no posterior pharyngeal cobblestoning [No Neck Mass] : no neck mass was observed [Normal Rate and Effort] : normal respiratory rhythm and effort [Wheezing] : no wheezing was heard [Normal S1, S2] : normal S1 and S2 [Normal Cervical Lymph Nodes] : cervical [Skin Intact] : skin intact  [de-identified] : No rash noted

## 2021-10-25 NOTE — REVIEW OF SYSTEMS
[Rhinorrhea] : rhinorrhea [Nasal Congestion] : nasal congestion [Sneezing] : sneezing [Nl] : Musculoskeletal [FreeTextEntry7] : Mild asthma [de-identified] : Rash [de-identified] : Bleeding disorder

## 2021-10-30 ENCOUNTER — APPOINTMENT (OUTPATIENT)
Dept: DISASTER EMERGENCY | Facility: OTHER | Age: 41
End: 2021-10-30
Payer: MEDICAID

## 2021-10-30 PROCEDURE — 0002A: CPT

## 2021-11-09 ENCOUNTER — APPOINTMENT (OUTPATIENT)
Dept: RHEUMATOLOGY | Facility: CLINIC | Age: 41
End: 2021-11-09
Payer: MEDICAID

## 2021-11-09 VITALS
BODY MASS INDEX: 30.37 KG/M2 | WEIGHT: 189 LBS | HEIGHT: 66 IN | DIASTOLIC BLOOD PRESSURE: 72 MMHG | OXYGEN SATURATION: 99 % | HEART RATE: 74 BPM | SYSTOLIC BLOOD PRESSURE: 108 MMHG

## 2021-11-09 DIAGNOSIS — R53.82 CHRONIC FATIGUE, UNSPECIFIED: ICD-10-CM

## 2021-11-09 DIAGNOSIS — R58 HEMORRHAGE, NOT ELSEWHERE CLASSIFIED: ICD-10-CM

## 2021-11-09 PROCEDURE — 99214 OFFICE O/P EST MOD 30 MIN: CPT

## 2021-11-09 NOTE — HISTORY OF PRESENT ILLNESS
[FreeTextEntry1] : HISTORY:\par 42 y/o female w/ asthma, Hx of appendectomy, myomectomy reports for follow up for evaluation for autoimmune disease. \par Patient has episodes of joint pains (worst in L wrist, no clear swelling), chest pain, flank pains, frequent urination, subjective fever, chills, which are worse when she is not on her period. Patient has had these episodes for many years but the most recent episode was terrible so comes to rheum for evaluation. Symptoms somewhat improved with montelukast. Patient had an episode of DIC related to her myomectomy, seen by hematology \par Exam without synovitis, tenderness, rash. \par Previous labs with unremarkable \par \par INTERVAL HISTORY:\par Pt has felt well since last visit. Pt continues to have episodes of pains,  symptoms that is worst around mid-cycle. Pt also had COVID vaccine and had some allergic reaction which resolved with Zyrtec. Pt is planning to get flu vaccine soon.\par Pt denies any new symptoms or any other concerns.\par \par WORKUP: \par Remarkable for (9/2021): Iron sat 12%, ESR 54, Vit D 25-OH 29.8,  \par Normal/neg (9/2021): CMP, CBC, CRP, U/A, UPCR, ANCAs, CPK, aldolase, SPEP, UPEP, TANIA, FLC, ACE level, TSH, ferritin, LDH, B12, Folate, EBV, Lyme, Hep B/C panel, Quantiferon TB\par \par

## 2021-11-09 NOTE — ASSESSMENT
[FreeTextEntry1] : 40 y/o female w/ asthma, Hx of appendectomy, myomectomy reports for follow up for evaluation for autoimmune disease. \par Patient has episodes of joint pains (worst in L wrist, no clear swelling), chest pain, flank pains, frequent urination, subjective fever, chills, which are worst during her mid-cycle. Patient has had these episodes for many years but the most recent episode was terrible so comes to rheum for evaluation. Pt has already had extensive workup by ob/gyn which have been negative. Symptoms somewhat improved with montelukast. Patient had an episode of DIC related to her myomectomy, seen by hematology \par Exam without synovitis, tenderness, rash. \par \par Extensive workup negative for underlying autoimmune rheumatologic disease except elevated ESR. Pt has normal CRP and I doubt that elevated ESR truly represents active inflammation. ESR is also higher with increasing age, female sex, anemia, renal disease, and obesity. I would not be concerned about this lab value unless there are other signs of active disease, or infection. Pt's symptoms may be associated with body's reaction to hormonal fluctuations.\par \par - If any further workup negative, pt should focus on symptom management\par - RTC PRN\par

## 2021-11-16 ENCOUNTER — NON-APPOINTMENT (OUTPATIENT)
Age: 41
End: 2021-11-16

## 2021-12-13 ENCOUNTER — APPOINTMENT (OUTPATIENT)
Dept: PAIN MANAGEMENT | Facility: CLINIC | Age: 41
End: 2021-12-13
Payer: MEDICAID

## 2021-12-13 VITALS
DIASTOLIC BLOOD PRESSURE: 88 MMHG | HEIGHT: 66 IN | HEART RATE: 83 BPM | SYSTOLIC BLOOD PRESSURE: 133 MMHG | BODY MASS INDEX: 30.22 KG/M2 | WEIGHT: 188 LBS

## 2021-12-13 PROCEDURE — 99204 OFFICE O/P NEW MOD 45 MIN: CPT

## 2021-12-13 NOTE — PHYSICAL EXAM
[FreeTextEntry1] : Constitutional: No signs of distress. No signs of toxicity. \par MS: Alert and well oriented. Speech fluent. No aphasia. Fund of knowledge intact. \par Psychiatric: Mood stable.\par CN: PERRLA: No papilledema; No VFC: No Eileen. V1-3 intact. No facial asymmetry. palate elevates symmetrically, tongue midline\par Motor: Adequate bulk, tone, strength. 5/5 strength\par DTR: present and symmetrical; no clonus\par Sensory: intact to primary and secondary modalities; neg Romberg\par Cerebellar and gait: intact\par Eyes: no redness or swelling\par HEENT: intact\par Neck: No masses noted\par Pulmonary: no respiratory distress\par Vascular: no temperature,color changes; no edema\par Musculoskeletal: examination of the cervical spine reveals no midline tenderness, range of motion full upon flexion, extension and lateral rotation. Negative facet tenderness, Negative Spurlings bilaterally. examination of the lumbar spine reveals no midline or paraspinal tenderness; Range of motion full upon flexion, extension and lateral rotation; negative facet loading, No tenderness of sciatic notch, No tenderness of bilateral greater trochanters, Negative HEMA, negative SLRT bilaterally,\par Abd: non tender. WHSS anterior abdominal region. \par Skin: No rash.\par

## 2021-12-13 NOTE — HISTORY OF PRESENT ILLNESS
[FreeTextEntry1] : 42 yo RH female co chronic pain\par \par In 2011 sp myectomy of the uterus. and since then about 6 weeks later co right lower quadrant. When severe, the pain can extend to back and anterior thigh  The pain is felt on the surface described but more intense deep described as "spicy, hot". When she urinates the pain is significantly less. \par \par Patient went to many doctors with a thorough workup and all was reported to be negative. RO endometriosis and scarring\par Menses makes her feel better. \par Naproxyn prn if severe uses Tyleniol #3  approx 75 pills per month\par \par Tried gabapentin ? amount wo relief and was fearful it would cause seizures.

## 2021-12-13 NOTE — ASSESSMENT
[FreeTextEntry1] : Chronic pain syndrome\par Abdominal pain\par \par Patient to get me to talk with her OBGYN to discuss evaluation and plan of care

## 2021-12-14 ENCOUNTER — NON-APPOINTMENT (OUTPATIENT)
Age: 41
End: 2021-12-14

## 2021-12-14 ENCOUNTER — APPOINTMENT (OUTPATIENT)
Dept: INTERNAL MEDICINE | Facility: CLINIC | Age: 41
End: 2021-12-14
Payer: MEDICAID

## 2021-12-14 VITALS
WEIGHT: 184 LBS | DIASTOLIC BLOOD PRESSURE: 88 MMHG | OXYGEN SATURATION: 98 % | HEART RATE: 92 BPM | BODY MASS INDEX: 29.7 KG/M2 | TEMPERATURE: 98.5 F | SYSTOLIC BLOOD PRESSURE: 146 MMHG

## 2021-12-14 DIAGNOSIS — R10.84 GENERALIZED ABDOMINAL PAIN: ICD-10-CM

## 2021-12-14 PROCEDURE — 99213 OFFICE O/P EST LOW 20 MIN: CPT | Mod: 25

## 2021-12-14 PROCEDURE — 90686 IIV4 VACC NO PRSV 0.5 ML IM: CPT

## 2021-12-14 PROCEDURE — G0008: CPT

## 2021-12-14 NOTE — ASSESSMENT
[FreeTextEntry1] : 40yo F seen for follow up of abd pain\par \par \par abd pain -- ? hormonal related per Dr. Ugalde - referred to reproductive endocrinology \par \par      advised to schedule with Dr. Win - phone number provided\par       advised again to follow with GI \par \par refill T#3 and plan to refill in 1 month/January 11, 2022

## 2021-12-14 NOTE — HISTORY OF PRESENT ILLNESS
[de-identified] : 42yo F w hx of chronic pelvic/abd pain, had eval years ago with GYN\par pain occurs with menses--on day 9 (4 days after menses) and 18 of cycle\par pain \par pain is severe prior to menses onset and then later in the cycle\par takes naproxen and tylenol with codeine\par \par has seen Dr. Ugalde for pain management =- ? hormones -- suggest she see endo\par \par Remainder of ROS reviewed and found to be negative.\par

## 2021-12-17 ENCOUNTER — RX RENEWAL (OUTPATIENT)
Age: 41
End: 2021-12-17

## 2021-12-30 ENCOUNTER — APPOINTMENT (OUTPATIENT)
Dept: INTERNAL MEDICINE | Facility: CLINIC | Age: 41
End: 2021-12-30
Payer: MEDICAID

## 2021-12-30 VITALS
HEART RATE: 98 BPM | DIASTOLIC BLOOD PRESSURE: 96 MMHG | WEIGHT: 190 LBS | OXYGEN SATURATION: 99 % | SYSTOLIC BLOOD PRESSURE: 150 MMHG | HEIGHT: 66 IN | TEMPERATURE: 98.5 F | BODY MASS INDEX: 30.53 KG/M2

## 2021-12-30 DIAGNOSIS — R21 RASH AND OTHER NONSPECIFIC SKIN ERUPTION: ICD-10-CM

## 2021-12-30 PROCEDURE — 99213 OFFICE O/P EST LOW 20 MIN: CPT

## 2021-12-30 RX ORDER — CAMPHOR 0.45 %
2 GEL (GRAM) TOPICAL
Qty: 1 | Refills: 0 | Status: ACTIVE | COMMUNITY
Start: 2021-12-30 | End: 1900-01-01

## 2021-12-30 NOTE — PHYSICAL EXAM
[No Acute Distress] : no acute distress [Well Developed] : well developed [Normal Sclera/Conjunctiva] : normal sclera/conjunctiva [EOMI] : extraocular movements intact [Normal Outer Ear/Nose] : the outer ears and nose were normal in appearance [Normal Oropharynx] : the oropharynx was normal [No JVD] : no jugular venous distention [Supple] : supple [No Respiratory Distress] : no respiratory distress  [No Accessory Muscle Use] : no accessory muscle use [Clear to Auscultation] : lungs were clear to auscultation bilaterally [Normal Rate] : normal rate  [No Edema] : there was no peripheral edema [Coordination Grossly Intact] : coordination grossly intact [Normal Gait] : normal gait [Normal Affect] : the affect was normal [Normal Mood] : the mood was normal [de-identified] : maculopapular rash most dense at the upper chest, spanning across shoulders and diffusely across back.

## 2021-12-30 NOTE — HISTORY OF PRESENT ILLNESS
[FreeTextEntry8] : 41F [facility] asthma who presents for an acute visit with complaint of rash\par \par She received her first dose of the COVID-19 vaccine (Pfizer) on 10/9/21, and developed a rash on her upper body a few days later. She noted it eventually improved when she started taking her monteleukast. She went to an Urgent Care who didn't take her insurance but advised her to see a dermatologist. She was advised to see an allergist who said it was likely from the vaccine and to continue taking her monteleukast and to start taking Zyrtec before she received her second dose of the vaccine. She had her second dose on 10/30/21 and the rash came back but was less severe. She stopped taking Zyrtec a few days later. On December 14th, she had her flu shot, she didn't pre-treat with Zyrtec because she did not expect to have a reaction since it was not the same vaccine, and she developed a similar reaction. her rash is still present, and she recently ran out of Zyrtec. She notes it is primarily on the trunk and shoulders, maculopapular. She has tried washing gently without soap, nothing is helping. She notes worsening of the rash at points of pressure such as from her bra strap.\par \par Of note, for her chronic abdominal pain she takes prescription strength Aleve and when it is more severe during her menstrual cycle she occasionally uses Acetaminaphen/Codeine. No other specific medication changes noted.

## 2021-12-30 NOTE — REVIEW OF SYSTEMS
[Itching] : Itching [Skin Rash] : skin rash [Negative] : Heme/Lymph [Fever] : no fever [Chills] : no chills

## 2022-02-22 ENCOUNTER — APPOINTMENT (OUTPATIENT)
Dept: INTERNAL MEDICINE | Facility: CLINIC | Age: 42
End: 2022-02-22
Payer: MEDICAID

## 2022-02-22 VITALS
WEIGHT: 187 LBS | TEMPERATURE: 98.2 F | OXYGEN SATURATION: 99 % | HEART RATE: 98 BPM | SYSTOLIC BLOOD PRESSURE: 156 MMHG | BODY MASS INDEX: 30.05 KG/M2 | HEIGHT: 66 IN | DIASTOLIC BLOOD PRESSURE: 84 MMHG

## 2022-02-22 DIAGNOSIS — M25.572 PAIN IN RIGHT ANKLE AND JOINTS OF RIGHT FOOT: ICD-10-CM

## 2022-02-22 DIAGNOSIS — M25.571 PAIN IN RIGHT ANKLE AND JOINTS OF RIGHT FOOT: ICD-10-CM

## 2022-02-22 PROCEDURE — 99214 OFFICE O/P EST MOD 30 MIN: CPT

## 2022-02-22 NOTE — HISTORY OF PRESENT ILLNESS
[FreeTextEntry8] : her left knee gave way while coming down stairs yesterday - she twisted her left ankle - swollen \par - limping while walking \par she massages yesterday -woke up in night with pain took tylenol ,later took naproxen - hekped with pain but swelling still present \par -no head trauma - fell sitting down

## 2022-02-22 NOTE — ASSESSMENT
[FreeTextEntry1] : b/l ankle swelling / pain left > rt \par - sprain vs ligament tear\par - ace bandage \par -cold compress\par - leg elevation \par -naproxen 500 po BID with food \par -ortho evaluation\par -er if s/s worsens

## 2022-02-22 NOTE — PHYSICAL EXAM
[Normal] : soft, non-tender, non-distended, no masses palpated, no HSM and normal bowel sounds [de-identified] : left ankle swelling > right

## 2022-02-24 ENCOUNTER — APPOINTMENT (OUTPATIENT)
Dept: ORTHOPEDIC SURGERY | Facility: CLINIC | Age: 42
End: 2022-02-24
Payer: MEDICAID

## 2022-02-24 VITALS
BODY MASS INDEX: 30.05 KG/M2 | SYSTOLIC BLOOD PRESSURE: 137 MMHG | HEIGHT: 66 IN | DIASTOLIC BLOOD PRESSURE: 92 MMHG | WEIGHT: 187 LBS | HEART RATE: 97 BPM

## 2022-02-24 DIAGNOSIS — S80.11XA CONTUSION OF RIGHT LOWER LEG, INITIAL ENCOUNTER: ICD-10-CM

## 2022-02-24 DIAGNOSIS — S93.402A SPRAIN OF UNSPECIFIED LIGAMENT OF LEFT ANKLE, INITIAL ENCOUNTER: ICD-10-CM

## 2022-02-24 PROCEDURE — 99203 OFFICE O/P NEW LOW 30 MIN: CPT

## 2022-02-24 PROCEDURE — 73610 X-RAY EXAM OF ANKLE: CPT | Mod: LT

## 2022-02-24 NOTE — PHYSICAL EXAM
[de-identified] : Constitutional: Well-nourished, well-developed, No acute distress\par Respiratory:  Good respiratory effort, no SOB\par Psychiatric: Pleasant and normal affect, alert and oriented x3\par Skin: Clean dry and intact Left LE\par Musculoskeletal: normal except where as noted in regional exam\par \par Left Ankle:\par APPEARANCE: + Moderate swelling ecchymosis of the anterolateral ankle and foot, no marked deformities  or malalignment\par POSITIVE TENDERNESS: ATFL, CFL, Lateral ankle\par NONTENDER: medial malleolus, lateral malleolus, tibialis posterior tendon, achilles tendon, no marked thickening of tendon, PTFL, anterior tibiofibular ligament (high ankle), sinus tarsus, deltoid ligaments, 5th metatarsal. \par RANGE OF MOTION: Mild limitation of PF and Inversion due to pain/swelling, NL DF and Eversion. \par RESISTIVE TESTING: Mild pain with resisted eversion and DF.  painless resisted  plantar flexion, and inversion. \par SPECIAL TESTS: + anterior drawer for pain without laxity, + talar tilt for pain without laxity, neg Kleiger's\par \par Right ankle:\par APPEARANCE: Minimal swelling distal/anterior shin, no marked deformities or malalignment\par POSITIVE TENDERNESS: Moderate to significant tenderness of the distal medial shin, no ankle tenderness\par NONTENDER: medial malleolus, lateral malleolus, tibialis posterior tendon, achilles tendon, no marked thickening of tendon, ATFL, CFL, PTFL, anterior tibiofibular ligament (high ankle), sinus tarsus, deltoid ligaments, 5th metatarsal. \par RANGE OF MOTION: full & painless. \par RESISTIVE TESTIN/5 painful resisted dorsiflexion, plantar flexion, inversion & eversion. \par SPECIAL TESTS: neg anterior drawer. neg talar tilt. neg Kleiger's\par  [de-identified] : \par The following radiographs were ordered and read by me during this patient's visit. I reviewed each radiograph in detail with the patient and discussed the findings as highlighted below. \par \par 3 views of the left ankle were obtained today that show no fracture, or dislocation. There are no degenerative changes seen. There is no malalignment. No obvious osseous abnormality. Otherwise unremarkable.

## 2022-02-24 NOTE — DISCUSSION/SUMMARY
[de-identified] : Discussed findings of today's exam and possible causes of patient's pain.  Educated patient on their most probable diagnosis of acute left ankle sprain, and acute right shin contusion.  Reviewed possible courses of treatment, and we collaboratively decided best course of treatment at this time will include conservative management.  Patient is having significant ankle pain and difficulty with ambulation, she can continue using her Ace wrap for support, and was given a cam boot to be worn for ambulatory assistive device.  Patient is already undergoing physical therapy for other musculoskeletal issues, recommend addition of this left ankle issue to her course of PT.  Patient has naproxen from her other orthopedic issues, she is advised she can take this medication every 12 hours with food as needed for pain.  Patient has no evidence of any significant myofascial or ligament tear/rupture, recommend deferral of advanced imaging with MRI at this time as patient has no apparent surgical indications.  Recommend follow-up in 2-4 weeks for reassessment.  Patient appreciates and agrees with current plan.\par \par I work as part of an academic orthopedic group and routinely have a physician in training (resident / fellow) working with me.  Any part of the history and physical exam performed by the physician in training was either directly reviewed and/or replicated by myself.  Any procedure performed by the physician in training was performed under my direct supervision and with the consent of the patient.\par \par This note was generated using dragon medical dictation software.  A reasonable effort has been made for proofreading its contents, but typos may still remain.  If there are any questions or points of clarification needed please notify my office.

## 2022-02-24 NOTE — HISTORY OF PRESENT ILLNESS
[de-identified] : Patient is here for b/l ankle pain, L>R, that began 3 days ago after falling going down stairs. Patient was seen by her PCP who recommended treatment and referral. She has not had images taken. She is having difficulty bearing weight. She is using compression and NAproxen for pain relief. She is a student. \par \par The patient's past medical history, past surgical history, medications and allergies were reviewed by me today and documented accordingly. In addition, the patient's family and social history, which were noncontributory to this visit, were reviewed also. Intake form was reviewed. The patient has no family history of arthritis.

## 2022-02-24 NOTE — CONSULT LETTER
[Dear  ___] : Dear  [unfilled], [Consult Letter:] : I had the pleasure of evaluating your patient, [unfilled]. [Please see my note below.] : Please see my note below. [Consult Closing:] : Thank you very much for allowing me to participate in the care of this patient.  If you have any questions, please do not hesitate to contact me. [Sincerely,] : Sincerely, [FreeTextEntry2] : PCP [FreeTextEntry3] : Hank Shaw DO, ATC\par Primary Care Sports Medicine\par [facility] [place]

## 2022-03-20 ENCOUNTER — RX RENEWAL (OUTPATIENT)
Age: 42
End: 2022-03-20

## 2022-04-11 PROBLEM — Z11.59 SCREENING FOR VIRAL DISEASE: Status: ACTIVE | Noted: 2021-02-23

## 2022-05-26 ENCOUNTER — APPOINTMENT (OUTPATIENT)
Dept: GASTROENTEROLOGY | Facility: CLINIC | Age: 42
End: 2022-05-26
Payer: MEDICAID

## 2022-05-26 ENCOUNTER — NON-APPOINTMENT (OUTPATIENT)
Age: 42
End: 2022-05-26

## 2022-05-26 VITALS
SYSTOLIC BLOOD PRESSURE: 137 MMHG | WEIGHT: 181 LBS | BODY MASS INDEX: 30.9 KG/M2 | HEIGHT: 64 IN | HEART RATE: 68 BPM | DIASTOLIC BLOOD PRESSURE: 88 MMHG

## 2022-05-26 DIAGNOSIS — Z80.42 FAMILY HISTORY OF MALIGNANT NEOPLASM OF PROSTATE: ICD-10-CM

## 2022-05-26 DIAGNOSIS — E66.9 OBESITY, UNSPECIFIED: ICD-10-CM

## 2022-05-26 DIAGNOSIS — R10.31 RIGHT LOWER QUADRANT PAIN: ICD-10-CM

## 2022-05-26 DIAGNOSIS — R11.2 NAUSEA WITH VOMITING, UNSPECIFIED: ICD-10-CM

## 2022-05-26 PROCEDURE — 99204 OFFICE O/P NEW MOD 45 MIN: CPT

## 2022-05-26 RX ORDER — POLYETHYLENE GLYCOL-3350, SODIUM CHLORIDE, POTASSIUM CHLORIDE AND SODIUM BICARBONATE 420; 11.2; 5.72; 1.48 G/438.4G; G/438.4G; G/438.4G; G/438.4G
420 POWDER, FOR SOLUTION ORAL
Qty: 1 | Refills: 0 | Status: DISCONTINUED | COMMUNITY
Start: 2019-04-02 | End: 2022-05-26

## 2022-05-26 RX ORDER — BECLOMETHASONE DIPROPIONATE
POWDER (GRAM) MISCELLANEOUS
Refills: 0 | Status: DISCONTINUED | COMMUNITY
End: 2022-05-26

## 2022-05-26 NOTE — HISTORY OF PRESENT ILLNESS
[FreeTextEntry1] : For years, Kati has been experiencing intermittent severe burning RLQ pain, sometimes radiating around to the right low back.  The pain tends to be worse 9-18 days after menses, typically much improved during the menses.  However, over the years, the pain has become increasingly more severe.  She has more pain when she has a full urinary bladder.  She recalls being advised during prior surgery of a band between the rectum and uterus that was not transected.  She has undergone appendectomy, myomectomy, uterine polypectomy, and lysis of adhesions.  Colonoscopy in 2015 reportedly revealed only hemorrhoids, and MRI 2018 reportedly was negative. She has seen GYN on multiple occasions, even undergoing laparoscopy, which was reportedly unrevealing.  She needs Tylenol with codeine when the pain is severe, which helps for several hours, only for the burning to promptly relapse.  She notes nausea after NSAIDs, generally tolerating naproxen better than the others.  She had seen Dr. Londono in April 2019 with similar complaints.  Today is day one of menses.

## 2022-05-26 NOTE — PHYSICAL EXAM
[General Appearance - Alert] : alert [General Appearance - In No Acute Distress] : in no acute distress [General Appearance - Well Nourished] : well nourished [General Appearance - Well Developed] : well developed [Sclera] : the sclera and conjunctiva were normal [Neck Appearance] : the appearance of the neck was normal [Neck Cervical Mass (___cm)] : no neck mass was observed [Jugular Venous Distention Increased] : there was no jugular-venous distention [Thyroid Diffuse Enlargement] : the thyroid was not enlarged [Thyroid Nodule] : there were no palpable thyroid nodules [Auscultation Breath Sounds / Voice Sounds] : lungs were clear to auscultation bilaterally [Heart Rate And Rhythm] : heart rate was normal and rhythm regular [Heart Sounds] : normal S1 and S2 [Heart Sounds Gallop] : no gallops [Murmurs] : no murmurs [Heart Sounds Pericardial Friction Rub] : no pericardial rub [Full Pulse] : the pedal pulses are present [Edema] : there was no peripheral edema [Abdomen Soft] : soft [Abdomen Tenderness] : non-tender [] : no hepato-splenomegaly [Abdomen Mass (___ Cm)] : no abdominal mass palpated [Diminished] : diminished [Patient Refused] : rectal exam was refused by the patient [Cervical Lymph Nodes Enlarged Posterior Bilaterally] : posterior cervical [Cervical Lymph Nodes Enlarged Anterior Bilaterally] : anterior cervical [Supraclavicular Lymph Nodes Enlarged Bilaterally] : supraclavicular [Inguinal Lymph Nodes Enlarged Bilaterally] : inguinal [Nail Clubbing] : no clubbing  or cyanosis of the fingernails [Musculoskeletal - Swelling] : no joint swelling seen [Skin Color & Pigmentation] : normal skin color and pigmentation [Skin Turgor] : normal skin turgor [Oriented To Time, Place, And Person] : oriented to person, place, and time [Impaired Insight] : insight and judgment were intact [Affect] : the affect was normal [FreeTextEntry1] : menses

## 2022-05-26 NOTE — CONSULT LETTER
[Dear  ___] : Dear  [unfilled], [Courtesy Letter:] : I had the pleasure of seeing your patient, [unfilled], in my office today. [Please see my note below.] : Please see my note below. [Consult Closing:] : Thank you very much for allowing me to participate in the care of this patient.  If you have any questions, please do not hesitate to contact me. [Sincerely,] : Sincerely, [FreeTextEntry3] : Martín Pelaez M.D.\par

## 2022-05-26 NOTE — ASSESSMENT
[FreeTextEntry1] : 1.  Increasingly severe RLQ/right low back pain that occurs 9-18 days after menstrual cycle--suspect endometriosis despite negative evaluation to date.  May have adhesions that are contributory.\par 2.  Occasional nausea after NSAIDs.\par 3.  Obesity.\par 4.  Seasonal allergies.\par 5.  Status post appendectomy, myomectomy, uterine polypectomy, lysis of adhesions.\par \par Plan:\par 1.  Medical records reviewed.\par 2.  CT scan abdomen/pelvis with oral and IV contrast during the nine days when pain is at its zenith.  If this cannot be readily arranged, then she was advised to go to the ER during that timeframe for labs and imaging.\par 3.  If CT scan unrevealing, return here for consideration of repeat colonoscopy (and perhaps EGD).\par 4.  Ultimately, I suspect that I would be recommending diagnostic laparoscopy at the time that the pain is most severe.  She may ultimately require hysterectomy.

## 2022-06-01 ENCOUNTER — NON-APPOINTMENT (OUTPATIENT)
Age: 42
End: 2022-06-01

## 2022-06-08 ENCOUNTER — APPOINTMENT (OUTPATIENT)
Dept: CT IMAGING | Facility: IMAGING CENTER | Age: 42
End: 2022-06-08

## 2022-06-21 ENCOUNTER — RX RENEWAL (OUTPATIENT)
Age: 42
End: 2022-06-21

## 2022-07-06 ENCOUNTER — RX RENEWAL (OUTPATIENT)
Age: 42
End: 2022-07-06

## 2022-07-07 ENCOUNTER — RX RENEWAL (OUTPATIENT)
Age: 42
End: 2022-07-07

## 2022-07-25 ENCOUNTER — RX RENEWAL (OUTPATIENT)
Age: 42
End: 2022-07-25

## 2022-08-04 RX ORDER — CETIRIZINE HYDROCHLORIDE 10 MG/1
10 TABLET, FILM COATED ORAL DAILY
Qty: 30 | Refills: 1 | Status: ACTIVE | COMMUNITY
Start: 2021-12-30 | End: 1900-01-01

## 2022-08-09 ENCOUNTER — APPOINTMENT (OUTPATIENT)
Dept: INTERNAL MEDICINE | Facility: CLINIC | Age: 42
End: 2022-08-09

## 2022-08-09 VITALS
DIASTOLIC BLOOD PRESSURE: 88 MMHG | OXYGEN SATURATION: 98 % | SYSTOLIC BLOOD PRESSURE: 144 MMHG | WEIGHT: 182 LBS | BODY MASS INDEX: 31.07 KG/M2 | HEART RATE: 82 BPM | HEIGHT: 64 IN

## 2022-08-09 PROCEDURE — 99213 OFFICE O/P EST LOW 20 MIN: CPT

## 2022-08-09 RX ORDER — NAPROXEN 500 MG/1
500 TABLET ORAL
Qty: 60 | Refills: 0 | Status: COMPLETED | COMMUNITY
Start: 2022-02-24 | End: 2022-08-09

## 2022-08-09 RX ORDER — ALBUTEROL SULFATE 90 UG/1
108 (90 BASE) AEROSOL, METERED RESPIRATORY (INHALATION)
Qty: 1 | Refills: 5 | Status: COMPLETED | COMMUNITY
Start: 2021-02-23 | End: 2022-08-09

## 2022-08-09 RX ORDER — CALCIUM CARBONATE 750 MG/1
220 TABLET ORAL
Qty: 90 | Refills: 0 | Status: COMPLETED | COMMUNITY
Start: 2021-12-17 | End: 2022-08-09

## 2022-08-10 NOTE — ASSESSMENT
[FreeTextEntry1] : 41yo F with hx of chronic pain seen for follo wup\par \par Pelvic pain - ddx include endometriosis, neuropathic pain\par            she denies hx of trauma\par             now unable to get CT\par              I suggest she go to the ER should pain become severe\par               cont naproxen\par               prn tylenol #3 -- increase amount to 4 tablets per day - advised to call monthly ahead of when med is needed\par  \par med rec completed\par T3 refilled\par urogyn referral\par \par consider accpuncture for pain, CBD for pain, revisit GI for colonoscopy, possibly laporoscopy to r/o endometriosis\par I agave her the phone # for GYN at [facility]

## 2022-09-14 ENCOUNTER — APPOINTMENT (OUTPATIENT)
Dept: UROGYNECOLOGY | Facility: CLINIC | Age: 42
End: 2022-09-14

## 2022-09-14 VITALS
BODY MASS INDEX: 31.07 KG/M2 | SYSTOLIC BLOOD PRESSURE: 121 MMHG | HEIGHT: 64 IN | DIASTOLIC BLOOD PRESSURE: 82 MMHG | WEIGHT: 182 LBS | TEMPERATURE: 96.8 F

## 2022-09-14 PROCEDURE — 99202 OFFICE O/P NEW SF 15 MIN: CPT

## 2022-09-14 NOTE — HISTORY OF PRESENT ILLNESS
[Unable To Restrain Bowel Movement] : no [Rectal Prolapse] : no [Urinary Frequency] : no [de-identified] : rare episodes when pain is severe  [de-identified] : when pain is severe  [de-identified] : reports increased during pain attacks [FreeTextEntry9] : during severe pain  [FreeTextEntry6] : secondary to pain medication  [FreeTextEntry1] : Kati is a 41yo presenting with complaints of pelvic pain s/p abdominal myomectomy in 2011. She reports the pain begin at the end of her menstrual cycle and will be present until her menses begins again. At times this pain is so severe and has associated urinary symptoms of frequency and rare episodes of urgency incontinence with severe pain. She does not have any urinary complaints today. \par \par She reports multiple MRI/CT scans without any findings, she reports colonoscopy and EDG were normal. She underwent laparoscopy and hysteroscopy with Dr. Win in 2019 with no abnormal findings. \par \par She has not been on any hormonal or OCPs since after surgery in 2011. \par \par PMH: asthma\par PSH: abdominal myomectomy, appendectomy, diagnostic laparoscopy/hysteroscopy/chromotubation with benign findings (Dr. Ryan Win)

## 2022-09-14 NOTE — REASON FOR VISIT
[Questionnaire Received] : Patient questionnaire received [Intake Form Reviewed] : Patient intake form with past medical history, surgical history, family history and social history reviewed today [Pelvic Pain] : pelvic pain [Initial Visit ___] : an initial visit for [unfilled]

## 2022-09-14 NOTE — DISCUSSION/SUMMARY
[FreeTextEntry1] : Patient here for a 10-year history of pelvic pain.We discussed that I do not manage chronic pelvic pain and that her pain seems gynecological or homonal in nature. We reviewed that she has tried OCPs before her myomectomy but not afterwards. We will refer her to Dr. Sukhdeep Villavicencio from [facility] for care. We discussed that if she still has urinary symptoms after management of her chronic pelvic pain, she should come back for evaluation.  All questions were answered.

## 2022-09-19 DIAGNOSIS — Z87.828 PERSONAL HISTORY OF OTHER (HEALED) PHYSICAL INJURY AND TRAUMA: ICD-10-CM

## 2022-09-19 DIAGNOSIS — Z87.09 PERSONAL HISTORY OF OTHER DISEASES OF THE RESPIRATORY SYSTEM: ICD-10-CM

## 2022-10-04 ENCOUNTER — RX RENEWAL (OUTPATIENT)
Age: 42
End: 2022-10-04

## 2022-10-31 ENCOUNTER — RX RENEWAL (OUTPATIENT)
Age: 42
End: 2022-10-31

## 2022-11-01 ENCOUNTER — RX RENEWAL (OUTPATIENT)
Age: 42
End: 2022-11-01

## 2022-11-07 ENCOUNTER — RX RENEWAL (OUTPATIENT)
Age: 42
End: 2022-11-07

## 2022-12-30 DIAGNOSIS — H53.9 UNSPECIFIED VISUAL DISTURBANCE: ICD-10-CM

## 2023-01-17 ENCOUNTER — APPOINTMENT (OUTPATIENT)
Dept: INTERNAL MEDICINE | Facility: CLINIC | Age: 43
End: 2023-01-17

## 2023-01-23 ENCOUNTER — NON-APPOINTMENT (OUTPATIENT)
Age: 43
End: 2023-01-23

## 2023-01-23 ENCOUNTER — APPOINTMENT (OUTPATIENT)
Dept: OPHTHALMOLOGY | Facility: CLINIC | Age: 43
End: 2023-01-23
Payer: MEDICAID

## 2023-01-23 PROCEDURE — 92004 COMPRE OPH EXAM NEW PT 1/>: CPT

## 2023-04-02 ENCOUNTER — NON-APPOINTMENT (OUTPATIENT)
Age: 43
End: 2023-04-02

## 2023-05-19 ENCOUNTER — RX RENEWAL (OUTPATIENT)
Age: 43
End: 2023-05-19

## 2023-06-23 ENCOUNTER — APPOINTMENT (OUTPATIENT)
Dept: INTERNAL MEDICINE | Facility: CLINIC | Age: 43
End: 2023-06-23
Payer: MEDICAID

## 2023-06-23 ENCOUNTER — RX RENEWAL (OUTPATIENT)
Age: 43
End: 2023-06-23

## 2023-06-23 VITALS
DIASTOLIC BLOOD PRESSURE: 83 MMHG | WEIGHT: 180 LBS | HEIGHT: 64 IN | BODY MASS INDEX: 30.73 KG/M2 | SYSTOLIC BLOOD PRESSURE: 153 MMHG | OXYGEN SATURATION: 98 % | TEMPERATURE: 98 F | HEART RATE: 64 BPM

## 2023-06-23 DIAGNOSIS — Z00.00 ENCOUNTER FOR GENERAL ADULT MEDICAL EXAMINATION W/OUT ABNORMAL FINDINGS: ICD-10-CM

## 2023-06-23 PROCEDURE — 99396 PREV VISIT EST AGE 40-64: CPT | Mod: 25

## 2023-06-23 PROCEDURE — 36415 COLL VENOUS BLD VENIPUNCTURE: CPT

## 2023-06-23 RX ORDER — BUDESONIDE 32 UG/1
32 SPRAY, METERED NASAL
Qty: 1 | Refills: 0 | Status: COMPLETED | COMMUNITY
Start: 2021-02-23 | End: 2023-06-23

## 2023-06-23 RX ORDER — ACETAMINOPHEN AND CODEINE PHOSPHATE 300; 30 MG/1; MG/1
300-30 TABLET ORAL
Qty: 120 | Refills: 0 | Status: COMPLETED | COMMUNITY
Start: 2021-03-15 | End: 2023-06-23

## 2023-06-26 PROBLEM — Z00.00 ENCOUNTER FOR PREVENTIVE HEALTH EXAMINATION: Status: ACTIVE | Noted: 2018-03-22

## 2023-06-26 LAB
25(OH)D3 SERPL-MCNC: 10.9 NG/ML
ALBUMIN SERPL ELPH-MCNC: 4.5 G/DL
ALP BLD-CCNC: 61 U/L
ALT SERPL-CCNC: 21 U/L
ANION GAP SERPL CALC-SCNC: 11 MMOL/L
AST SERPL-CCNC: 21 U/L
BILIRUB SERPL-MCNC: 0.2 MG/DL
BUN SERPL-MCNC: 10 MG/DL
CALCIUM SERPL-MCNC: 9.4 MG/DL
CHLORIDE SERPL-SCNC: 105 MMOL/L
CHOLEST SERPL-MCNC: 188 MG/DL
CO2 SERPL-SCNC: 26 MMOL/L
CREAT SERPL-MCNC: 0.78 MG/DL
EGFR: 96 ML/MIN/1.73M2
ESTIMATED AVERAGE GLUCOSE: 114 MG/DL
GLUCOSE SERPL-MCNC: 81 MG/DL
HBA1C MFR BLD HPLC: 5.6 %
HBV SURFACE AB SERPL IA-ACNC: 35 MIU/ML
HBV SURFACE AG SER QL: NONREACTIVE
HDLC SERPL-MCNC: 60 MG/DL
LDLC SERPL CALC-MCNC: 116 MG/DL
M TB IFN-G BLD-IMP: NEGATIVE
MEV IGG FLD QL IA: >300 AU/ML
MEV IGG+IGM SER-IMP: POSITIVE
MUV AB SER-ACNC: POSITIVE
MUV IGG SER QL IA: >300 AU/ML
NONHDLC SERPL-MCNC: 127 MG/DL
POTASSIUM SERPL-SCNC: 3.9 MMOL/L
PROT SERPL-MCNC: 7.6 G/DL
QUANTIFERON TB PLUS MITOGEN MINUS NIL: 9.63 IU/ML
QUANTIFERON TB PLUS NIL: 0.02 IU/ML
QUANTIFERON TB PLUS TB1 MINUS NIL: 0.01 IU/ML
QUANTIFERON TB PLUS TB2 MINUS NIL: 0 IU/ML
RUBV IGG FLD-ACNC: 27.6 INDEX
RUBV IGG SER-IMP: POSITIVE
SODIUM SERPL-SCNC: 142 MMOL/L
TRIGL SERPL-MCNC: 57 MG/DL
TSH SERPL-ACNC: 2.06 UIU/ML
VZV AB TITR SER: POSITIVE
VZV IGG SER IF-ACNC: 2677 INDEX

## 2023-06-26 NOTE — ASSESSMENT
[FreeTextEntry1] : 42yo F seen for CPE\par \par CPE today\par Labs today \par UTD with TDAP  - 2015\par has a GYN\par \par ? Mammogram\par ? ophtho\par due to see dentist\par \par Abd/pelvic pain - feels se has to take more of the T#3/developing tolerance\par                    inquiring on change in med. I prefer against oxycodone and trial of tramadol with OTC APAP and Nsaid for breakthrough\par \par

## 2023-06-26 NOTE — PHYSICAL EXAM
[de-identified] : Gen: Adult F, NAD\par Head: NC/AT\par EENT: ears grossly normal, PERRL, EOMI, moist mucosa\par Neck: supple\par Chest wall: nontender\par CV: normal s1 +s2, rrr, no murmurs\par Pulm: CTA-B\par Abd: soft, NT, ND\par Skin: no rashes\par Back: no CVA tenderness, no spinal tenderness\par Neuro: gait normal, AAOx3\par Psych: normal affect, normal interaction\par

## 2023-06-26 NOTE — HISTORY OF PRESENT ILLNESS
[FreeTextEntry1] : CPE [de-identified] : 44yo F chronic abd/pelvic pain seen for CPE\par \par covid vaccines 3 doses\par flu shot last season\par had mammo before covid\par had eyes checked \par \par enrolled in school and may have semester abroad

## 2023-06-26 NOTE — PHYSICAL EXAM
[de-identified] : Gen: Adult F, NAD\par Head: NC/AT\par EENT: ears grossly normal, PERRL, EOMI, moist mucosa\par Neck: supple\par Chest wall: nontender\par CV: normal s1 +s2, rrr, no murmurs\par Pulm: CTA-B\par Abd: soft, NT, ND\par Skin: no rashes\par Back: no CVA tenderness, no spinal tenderness\par Neuro: gait normal, AAOx3\par Psych: normal affect, normal interaction\par

## 2023-06-26 NOTE — HISTORY OF PRESENT ILLNESS
[FreeTextEntry1] : CPE [de-identified] : 44yo F chronic abd/pelvic pain seen for CPE\par \par covid vaccines 3 doses\par flu shot last season\par had mammo before covid\par had eyes checked \par \par enrolled in school and may have semester abroad

## 2023-07-21 ENCOUNTER — APPOINTMENT (OUTPATIENT)
Dept: INTERNAL MEDICINE | Facility: CLINIC | Age: 43
End: 2023-07-21

## 2023-08-01 DIAGNOSIS — Z23 ENCOUNTER FOR IMMUNIZATION: ICD-10-CM

## 2023-08-02 ENCOUNTER — MED ADMIN CHARGE (OUTPATIENT)
Age: 43
End: 2023-08-02

## 2023-08-02 ENCOUNTER — APPOINTMENT (OUTPATIENT)
Dept: INTERNAL MEDICINE | Facility: CLINIC | Age: 43
End: 2023-08-02
Payer: MEDICAID

## 2023-08-02 PROCEDURE — 90471 IMMUNIZATION ADMIN: CPT

## 2023-08-02 PROCEDURE — 90734 MENACWYD/MENACWYCRM VACC IM: CPT

## 2023-08-04 ENCOUNTER — APPOINTMENT (OUTPATIENT)
Dept: INTERNAL MEDICINE | Facility: CLINIC | Age: 43
End: 2023-08-04

## 2023-08-07 ENCOUNTER — NON-APPOINTMENT (OUTPATIENT)
Age: 43
End: 2023-08-07

## 2023-08-15 ENCOUNTER — RX RENEWAL (OUTPATIENT)
Age: 43
End: 2023-08-15

## 2023-09-11 NOTE — H&P PST ADULT - SKIN/BREAST
details… Nsaids Counseling: NSAID Counseling: I discussed with the patient that NSAIDs should be taken with food. Prolonged use of NSAIDs can result in the development of stomach ulcers.  Patient advised to stop taking NSAIDs if abdominal pain occurs.  The patient verbalized understanding of the proper use and possible adverse effects of NSAIDs.  All of the patient's questions and concerns were addressed.

## 2023-09-26 ENCOUNTER — RX RENEWAL (OUTPATIENT)
Age: 43
End: 2023-09-26

## 2023-10-06 RX ORDER — ALBUTEROL SULFATE 90 UG/1
108 (90 BASE) AEROSOL, METERED RESPIRATORY (INHALATION)
Qty: 1 | Refills: 0 | Status: ACTIVE | COMMUNITY
Start: 2023-03-18 | End: 1900-01-01

## 2023-10-06 RX ORDER — FLUTICASONE PROPIONATE 50 UG/1
50 SPRAY, METERED NASAL
Qty: 48 | Refills: 0 | Status: ACTIVE | COMMUNITY
Start: 2021-02-26 | End: 1900-01-01

## 2023-10-13 ENCOUNTER — APPOINTMENT (OUTPATIENT)
Dept: INTERNAL MEDICINE | Facility: CLINIC | Age: 43
End: 2023-10-13

## 2023-11-10 RX ORDER — BECLOMETHASONE DIPROPIONATE 80 UG/1
80 AEROSOL, METERED NASAL
Qty: 10.6 | Refills: 6 | Status: ACTIVE | COMMUNITY
Start: 2021-09-14 | End: 1900-01-01

## 2023-12-18 ENCOUNTER — APPOINTMENT (OUTPATIENT)
Dept: INTERNAL MEDICINE | Facility: CLINIC | Age: 43
End: 2023-12-18

## 2023-12-19 ENCOUNTER — APPOINTMENT (OUTPATIENT)
Dept: INTERNAL MEDICINE | Facility: CLINIC | Age: 43
End: 2023-12-19
Payer: MEDICAID

## 2023-12-19 VITALS
DIASTOLIC BLOOD PRESSURE: 96 MMHG | OXYGEN SATURATION: 98 % | HEIGHT: 64 IN | RESPIRATION RATE: 16 BRPM | SYSTOLIC BLOOD PRESSURE: 155 MMHG | HEART RATE: 89 BPM

## 2023-12-19 DIAGNOSIS — M65.4 RADIAL STYLOID TENOSYNOVITIS [DE QUERVAIN]: ICD-10-CM

## 2023-12-19 DIAGNOSIS — M25.50 PAIN IN UNSPECIFIED JOINT: ICD-10-CM

## 2023-12-19 DIAGNOSIS — R39.9 UNSPECIFIED SYMPTOMS AND SIGNS INVOLVING THE GENITOURINARY SYSTEM: ICD-10-CM

## 2023-12-19 PROCEDURE — 99214 OFFICE O/P EST MOD 30 MIN: CPT | Mod: 25

## 2023-12-19 PROCEDURE — 36415 COLL VENOUS BLD VENIPUNCTURE: CPT

## 2023-12-19 NOTE — HISTORY OF PRESENT ILLNESS
[FreeTextEntry8] : Pt showed up 30 minutes late for a 20 minute acute appt.  42 yo here for several issues. Sometimes notices urine is dark, then clears. When it's dark, notes pain in her bones throughout. No real dysuria. Thought it might be the tramadol, but symptoms occurred even when she did not take it. Episodes last days, pt tries to hydrate, does not help. Does not think its related to taking the naproxen, which she takes sometimes and sometimes Tylenol.  Also notes pains in her wrists bilat, doing a lot of typing, working on her PhD. Has h/o DeQuervains, wears some braces for her wrists, was offered surgery (for CTS) but declined so had injections which helped, now wrist and right thumb hurt again.

## 2023-12-19 NOTE — PHYSICAL EXAM
[Normal] : no acute distress, well nourished, well developed and well-appearing [No CVA Tenderness] : no CVA  tenderness [No Joint Swelling] : no joint swelling [Grossly Normal Strength/Tone] : grossly normal strength/tone [No Skin Lesions] : no skin lesions [Normal Gait] : normal gait [de-identified] : no suprapubic tenderness [de-identified] : bilat wrists WNL

## 2023-12-20 LAB
ALBUMIN SERPL ELPH-MCNC: 4.4 G/DL
ALP BLD-CCNC: 51 U/L
ALT SERPL-CCNC: 11 U/L
ANION GAP SERPL CALC-SCNC: 12 MMOL/L
AST SERPL-CCNC: 15 U/L
BASOPHILS # BLD AUTO: 0.04 K/UL
BASOPHILS NFR BLD AUTO: 0.7 %
BILIRUB SERPL-MCNC: 0.2 MG/DL
BILIRUB UR QL STRIP: NORMAL
BUN SERPL-MCNC: 12 MG/DL
CALCIUM SERPL-MCNC: 10 MG/DL
CHLORIDE SERPL-SCNC: 102 MMOL/L
CK SERPL-CCNC: 87 U/L
CO2 SERPL-SCNC: 24 MMOL/L
CREAT SERPL-MCNC: 0.89 MG/DL
EGFR: 82 ML/MIN/1.73M2
EOSINOPHIL # BLD AUTO: 0.23 K/UL
EOSINOPHIL NFR BLD AUTO: 4 %
GLUCOSE SERPL-MCNC: 76 MG/DL
GLUCOSE UR-MCNC: NORMAL
HCG UR QL: 0.2 EU/DL
HCT VFR BLD CALC: 40.7 %
HGB BLD-MCNC: 12.5 G/DL
HGB UR QL STRIP.AUTO: NORMAL
IMM GRANULOCYTES NFR BLD AUTO: 0.2 %
KETONES UR-MCNC: NORMAL
LEUKOCYTE ESTERASE UR QL STRIP: NORMAL
LYMPHOCYTES # BLD AUTO: 2.45 K/UL
LYMPHOCYTES NFR BLD AUTO: 43.1 %
MAN DIFF?: NORMAL
MCHC RBC-ENTMCNC: 26.2 PG
MCHC RBC-ENTMCNC: 30.7 GM/DL
MCV RBC AUTO: 85.3 FL
MONOCYTES # BLD AUTO: 0.52 K/UL
MONOCYTES NFR BLD AUTO: 9.1 %
NEUTROPHILS # BLD AUTO: 2.44 K/UL
NEUTROPHILS NFR BLD AUTO: 42.9 %
NITRITE UR QL STRIP: NORMAL
PH UR STRIP: 7
PLATELET # BLD AUTO: 290 K/UL
POTASSIUM SERPL-SCNC: 3.7 MMOL/L
PROT SERPL-MCNC: 7.2 G/DL
PROT UR STRIP-MCNC: NORMAL
RBC # BLD: 4.77 M/UL
RBC # FLD: 13.6 %
SODIUM SERPL-SCNC: 138 MMOL/L
SP GR UR STRIP: 1.02
TSH SERPL-ACNC: 1.32 UIU/ML
WBC # FLD AUTO: 5.69 K/UL

## 2023-12-22 DIAGNOSIS — R10.2 PELVIC AND PERINEAL PAIN: ICD-10-CM

## 2023-12-29 ENCOUNTER — APPOINTMENT (OUTPATIENT)
Dept: ORTHOPEDIC SURGERY | Facility: CLINIC | Age: 43
End: 2023-12-29
Payer: MEDICAID

## 2023-12-29 VITALS — HEIGHT: 66 IN | WEIGHT: 178 LBS | BODY MASS INDEX: 28.61 KG/M2

## 2023-12-29 DIAGNOSIS — M65.4 RADIAL STYLOID TENOSYNOVITIS [DE QUERVAIN]: ICD-10-CM

## 2023-12-29 DIAGNOSIS — M79.604 PAIN IN RIGHT LEG: ICD-10-CM

## 2023-12-29 DIAGNOSIS — M79.605 PAIN IN RIGHT LEG: ICD-10-CM

## 2023-12-29 PROCEDURE — 99203 OFFICE O/P NEW LOW 30 MIN: CPT

## 2024-01-08 ENCOUNTER — APPOINTMENT (OUTPATIENT)
Dept: ORTHOPEDIC SURGERY | Facility: CLINIC | Age: 44
End: 2024-01-08
Payer: MEDICAID

## 2024-01-08 PROCEDURE — 73502 X-RAY EXAM HIP UNI 2-3 VIEWS: CPT

## 2024-01-08 PROCEDURE — 99214 OFFICE O/P EST MOD 30 MIN: CPT

## 2024-01-08 PROCEDURE — 72100 X-RAY EXAM L-S SPINE 2/3 VWS: CPT

## 2024-01-08 PROCEDURE — 99204 OFFICE O/P NEW MOD 45 MIN: CPT

## 2024-01-08 NOTE — ASU PATIENT PROFILE, ADULT - TRANSFUSION REACTION, PREVIOUS, PROFILE
kg (156 lb 9.6 oz). HEENT: Normocephalic and atraumatic. Pupils are equal, round, reactive to light and accommodation. Extraocular muscles are intact. Neck: Showed no JVD, no carotid bruit . Lungs: Clear to auscultation bilaterally. Heart: Regular without any murmur. Abdomen: Soft, nontender. No hepatosplenomegaly. Extremities: Lower extremities show no edema, clubbing, or cyanosis. Breasts: Examination not done today. Neuro exam: intact cranial nerves bilaterally no motor or sensory deficit, gait is normal. Lymphatic: no adenopathy appreciated in the supraclavicular, axillary, cervical or inguinal area.  Skin: Vesicular rash improving    REVIEW OF LABORATORY DATA:   Lab Results   Component Value Date    WBC 3.3 (L) 01/03/2024    HGB 13.3 01/03/2024    HCT 38.3 01/03/2024    MCV 97.8 01/03/2024     01/03/2024       Chemistry        Component Value Date/Time     01/03/2024 0932    K 4.4 01/03/2024 0932     01/03/2024 0932    CO2 28 01/03/2024 0932    BUN 19 01/03/2024 0932    CREATININE <0.3 (L) 01/03/2024 1004    CREATININE 0.5 01/03/2024 0932        Component Value Date/Time    CALCIUM 9.1 01/03/2024 0932    ALKPHOS 85 01/03/2024 0932    AST 19 01/03/2024 0932    ALT 17 01/03/2024 0932    BILITOT 0.4 01/03/2024 0932            REVIEW OF RADIOLOGICAL RESULTS:     CT CHEST ABDOMEN PELVIS W CONTRAST Additional Contrast? None    Result Date: 1/6/2024  EXAMINATION: CT OF THE CHEST, ABDOMEN, AND PELVIS WITH CONTRAST 1/3/2024 10:06 am TECHNIQUE: CT of the chest, abdomen and pelvis was performed with the administration of intravenous contrast. Multiplanar reformatted images are provided for review. Automated exposure control, iterative reconstruction, and/or weight based adjustment of the mA/kV was utilized to reduce the radiation dose to as low as reasonably achievable. 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

## 2024-01-20 ENCOUNTER — APPOINTMENT (OUTPATIENT)
Dept: MRI IMAGING | Facility: IMAGING CENTER | Age: 44
End: 2024-01-20

## 2024-01-26 ENCOUNTER — APPOINTMENT (OUTPATIENT)
Dept: INTERNAL MEDICINE | Facility: CLINIC | Age: 44
End: 2024-01-26
Payer: MEDICAID

## 2024-01-26 VITALS
WEIGHT: 173 LBS | HEART RATE: 71 BPM | TEMPERATURE: 98.9 F | BODY MASS INDEX: 27.8 KG/M2 | HEIGHT: 66 IN | DIASTOLIC BLOOD PRESSURE: 84 MMHG | OXYGEN SATURATION: 98 % | SYSTOLIC BLOOD PRESSURE: 161 MMHG

## 2024-01-26 DIAGNOSIS — R03.0 ELEVATED BLOOD-PRESSURE READING, W/OUT DIAGNOSIS OF HYPERTENSION: ICD-10-CM

## 2024-01-26 DIAGNOSIS — J45.20 MILD INTERMITTENT ASTHMA, UNCOMPLICATED: ICD-10-CM

## 2024-01-26 PROCEDURE — 99214 OFFICE O/P EST MOD 30 MIN: CPT

## 2024-01-26 RX ORDER — NAPROXEN 500 MG/1
500 TABLET ORAL
Qty: 60 | Refills: 5 | Status: COMPLETED | COMMUNITY
Start: 2022-02-22 | End: 2024-01-26

## 2024-02-03 PROBLEM — J45.20 ASTHMA, INTERMITTENT, UNCOMPLICATED: Status: ACTIVE | Noted: 2021-02-23

## 2024-02-03 NOTE — ASSESSMENT
[FreeTextEntry1] : 44yo F with chronic pelvic pain and asthma seen for follow up.   Elevated BP - order placed for 14-hour BP monitoring  Asthma - stable  Hip pain - pt to follow up with ortho regarding additional imaging - ? can CT scan be done instead of MRI   Leg pain - monitor

## 2024-02-03 NOTE — HISTORY OF PRESENT ILLNESS
[FreeTextEntry1] : Right leg pain [de-identified] : 44yo F with hx of chronic pelvic pain, seen for follow up  today we discussed R leg pain  pt was seen by ortho for hip and leg pain MRI was suggested but denies by her insurance  she is also concerned for her blood pressure - sbp 160's today

## 2024-02-03 NOTE — PHYSICAL EXAM
[No Lymphadenopathy] : no lymphadenopathy [Supple] : supple [No Accessory Muscle Use] : no accessory muscle use [Clear to Auscultation] : lungs were clear to auscultation bilaterally [Regular Rhythm] : with a regular rhythm [Normal S1, S2] : normal S1 and S2 [de-identified] : adult F NAD [de-identified] : neg SLR

## 2024-02-13 ENCOUNTER — RX RENEWAL (OUTPATIENT)
Age: 44
End: 2024-02-13

## 2024-02-13 RX ORDER — DICLOFENAC SODIUM 75 MG/1
75 TABLET, DELAYED RELEASE ORAL TWICE DAILY
Qty: 30 | Refills: 1 | Status: ACTIVE | COMMUNITY
Start: 2024-01-26 | End: 1900-01-01

## 2024-02-13 RX ORDER — TIZANIDINE 2 MG/1
2 TABLET ORAL
Qty: 28 | Refills: 2 | Status: ACTIVE | COMMUNITY
Start: 2024-01-08 | End: 1900-01-01

## 2024-04-02 ENCOUNTER — RX RENEWAL (OUTPATIENT)
Age: 44
End: 2024-04-02

## 2024-04-02 RX ORDER — MONTELUKAST 10 MG/1
10 TABLET, FILM COATED ORAL
Qty: 90 | Refills: 3 | Status: ACTIVE | COMMUNITY
Start: 2021-02-23 | End: 1900-01-01

## 2024-04-05 ENCOUNTER — RX RENEWAL (OUTPATIENT)
Age: 44
End: 2024-04-05

## 2024-04-05 RX ORDER — ALBUTEROL SULFATE 90 UG/1
108 (90 BASE) INHALANT RESPIRATORY (INHALATION)
Qty: 18 | Refills: 6 | Status: ACTIVE | COMMUNITY
Start: 2021-09-01 | End: 1900-01-01

## 2024-05-02 NOTE — ASSESSMENT
[FreeTextEntry1] : 41F PMH asthma who presents for an acute visit with allergic rash in response to immunization.\par \par # Rash: Maculopapular truncal rash that initially occurred in response to her first dose of the Pfizer COVID-19 vaccine, recurred with the second vaccine and most recently to the flu shot. She has responded to a combination of Monteleukast and Zyrtec. No other significant systemic symptoms or signs of anaphylaxis.\par - C/w Monteleukast\par - C/w Zyrtec, refill prescribed.\par - Advised minimize Aleve and other NSAIDs for pain as they may reduce threshold for histamine release and worsen symptoms. Advised that codeine may also increase pruritis. At this time, patient recommended to primarily use Tylenol for pain.\par - Topical diphenhydramine for itch
10

## 2024-05-15 ENCOUNTER — APPOINTMENT (OUTPATIENT)
Dept: ORTHOPEDIC SURGERY | Facility: CLINIC | Age: 44
End: 2024-05-15
Payer: MEDICAID

## 2024-05-15 VITALS
HEART RATE: 94 BPM | DIASTOLIC BLOOD PRESSURE: 92 MMHG | BODY MASS INDEX: 27.8 KG/M2 | SYSTOLIC BLOOD PRESSURE: 181 MMHG | OXYGEN SATURATION: 98 % | WEIGHT: 173 LBS | HEIGHT: 66 IN

## 2024-05-15 DIAGNOSIS — M25.551 PAIN IN RIGHT HIP: ICD-10-CM

## 2024-05-15 PROCEDURE — ZZZZZ: CPT

## 2024-05-15 PROCEDURE — 99203 OFFICE O/P NEW LOW 30 MIN: CPT

## 2024-05-15 RX ORDER — TIZANIDINE 2 MG/1
2 TABLET ORAL EVERY 6 HOURS
Qty: 56 | Refills: 1 | Status: ACTIVE | COMMUNITY
Start: 2024-05-15 | End: 1900-01-01

## 2024-05-15 RX ORDER — PREDNISONE 10 MG/1
10 TABLET ORAL
Qty: 39 | Refills: 0 | Status: ACTIVE | COMMUNITY
Start: 2024-05-15 | End: 1900-01-01

## 2024-05-16 NOTE — PHYSICAL EXAM
[de-identified] : Gait - severe antalgic Significant difficulty getting up and down off the exam table or walking.  Station - Normal   Sagittal balance - Normal  Compensatory mechanism - None  heel walk- unable to perform Toe Walk - unable to perform  Reflexes:   Patellar: Normal   Gastroc: Normal   Clonus: No  Straight leg raise: positive on right   Pulses: 2+ dp/pt  Range of motion - normal   Sensation    Sensation is intact to light touch in the L1, L2, L3, L4, L5 and S1 dermatomes bilaterally.  Motor              IP           Quad         HS       TA      Gastroc      EHL Right:   3+/5        3+/5          5/5      3+/5        3+/5         5/5 Left:     5/5           5/5           5/5      5/5          5/5           5/5    [de-identified] : Xray lumbar spine ordered by outside provider Disc degeneration Facet arthropathy

## 2024-05-16 NOTE — HISTORY OF PRESENT ILLNESS
[de-identified] : 45 yo female with severe low back pain and right sided radiculopathy for the past 5 months, worse over the past month. She has a physician prescribing Tramadol and Tylenol with Codeine with minimal relief of symptoms. She has been unable to attend physical therapy as it is too painful. Someone drove her to the office today. Denies any bowel or bladder dysfunction or saddle anesthesia.

## 2024-05-16 NOTE — ASSESSMENT
[FreeTextEntry1] : 45 yo female with severe low back pain and right lower extremity radiculopathy. I recommended based on her level of pain to go to the emergency department. She states she does not feel they will help as she has been previously and she has gotten pain medication and sent home. She received a right hip injection today as well by Dr. Reid. She will start a course of Prednisone and an MRI lumbar spine has now been ordered for further evaluation. All patient questions answered to her satisfaction.

## 2024-05-20 PROBLEM — M25.551 HIP PAIN, RIGHT: Status: ACTIVE | Noted: 2024-01-08

## 2024-05-23 NOTE — PHYSICAL EXAM
[de-identified] : Constitutional: Well-nourished, well-developed, No acute distress Respiratory: Good respiratory effort, no SOB Lymphatic: No regional lymphadenopathy, no lymphedema Psychiatric: Pleasant and normal affect, alert and oriented x3 Skin: Clean dry and intact B/L UE/LE Musculoskeletal: normal except where as noted in regional exam  LUMBAR SPINE Inspection reveals no scoliosis Range of motion testing demonstrates pain with flexion, full ROM Facet loading maneuver negative + tenderness to palpation of lumbar paraspinal muscles. Seated slump test : + right  HIPS/PELVIS - Symmetric in standing and lying Hip maneuvers: ROM testing limited by pain NEURO - Normal bulk and tone Toe-walking and heel-walking intact Sensation - intact to light touch in bilateral lower extremities. LE Reflexes 2+ patellar and Achilles reflexes bilaterally no Clonus + TTP Greater trochanter, +TTP gluteus medius Coordination was age appropriate and intact in all 4 limbs. GAIT - Normal base, normal stride length, non-antalgic

## 2024-05-23 NOTE — PROCEDURE
[de-identified] : Ultrasound Guided Injection  Indication for ultrasound guidance: Ensure placement within a deep greater trochanteric bursa  Utlizing the [email or web address] portable ultrasound machine, the Curvilinear 30cm 5-2 MHz transducer, sterile probe cover and sterile ultrasound gel, ultrasound guidance with the probe in the longitudinal axis, utilizing an in-plane approach, was used for the following injection:   Injection: R Hip. Indication: gluteal tendinopathy  A discussion was had with the patient regarding this procedure and all questions were answered. All risks, benefits and alternatives were discussed. These included but were not limited to bleeding, infection, and allergic reaction. Chlorhexidine was used to sterilize and prep the area in the lateral aspect of the hip. A timeout was done to ensure correct side and pt agreed to the procedure.  Ethyl chloride spray was then used as a topical anesthetic. A 22-gauge 2" needle was used to inject 2cc of 1% lidocaine and 1cc of 40mg/ml methylprednisolone into the greater trochanteric bursa using a needling technique. A sterile bandage was then applied. The patient tolerated the procedure well and there were no complications.

## 2024-05-23 NOTE — DISCUSSION/SUMMARY
[de-identified] : Discussed findings of today's exam and possible causes of patient's pain.  Educated patient on their most probable diagnosis of gluteal tendinopathy and gluteus medius weakness and lumbar radiculopathy.  Reviewed possible courses of treatment, and we collaboratively decided best course of treatment at this time will include referral to physical therapy to improve gluteal strength.  We discussed that to prevent recurrence, the patient will have to learn and maintain a home stretching and exercise routine. Given severity and length of patient's symptoms, I have provided an ultrasound guided cortisone injection to the lateral hip as well. Informed the patient that the numbing medicine in today's injection will last for about 4-6 hours. The steroid that was injected will start to work in 1 to 2 days, peak at 1-2 weeks, and may last up to 4-6 weeks.  Pain may worsen in over the next 72 hours.   Patient will obtain an MRI of the hip. Follow up after imaging. Patient was instructed to call the office when MRI is complete to set up a follow up appointment for discussion of results and further treatment. A phone call will only be made to the patient in the case of urgent findings requiring immediate attention.   Patient was also referred to spine for further examination and treatment.   Cherie Reid MD, [facility] Department of Orthopedics

## 2024-05-23 NOTE — HISTORY OF PRESENT ILLNESS
[de-identified] : Pt present today with severe pain, 10/10. and sharp in nature. SHe reports that the pain is debilitating and she has difficulty walking and can not go up stairs. She was last seen in 1/2-24, her MRI was denied . She denies any new injury or trauma.

## 2024-05-23 NOTE — ADDENDUM
[FreeTextEntry1] : I, Emilee Villareal ATC, assisted with the history and documentation for Dr. Reid on this date 05/15/2024

## 2024-05-29 ENCOUNTER — APPOINTMENT (OUTPATIENT)
Dept: ORTHOPEDIC SURGERY | Facility: CLINIC | Age: 44
End: 2024-05-29
Payer: MEDICAID

## 2024-05-29 PROCEDURE — 99214 OFFICE O/P EST MOD 30 MIN: CPT

## 2024-05-29 NOTE — PHYSICAL EXAM
[de-identified] : Gait - severe antalgic Significant difficulty getting up and down off the exam table or walking.  Station - Normal   Sagittal balance - Normal  Compensatory mechanism - None  heel walk- unable to perform Toe Walk - unable to perform  Reflexes:   Patellar: Normal   Gastroc: Normal   Clonus: No  Straight leg raise: positive on right   Pulses: 2+ dp/pt  Range of motion - normal   Sensation    Sensation is intact to light touch in the L1, L2, L3, L4, L5 and S1 dermatomes bilaterally.  Motor              IP           Quad         HS       TA      Gastroc      EHL Right:   3+/5        3+/5          3/5      2/5        2/5         2/5 Left:     5/5           5/5           5/5      5/5          5/5           5/5 [de-identified] : Xray lumbar spine ordered by outside provider Disc degeneration Facet arthropathy

## 2024-05-29 NOTE — HISTORY OF PRESENT ILLNESS
[de-identified] : Patient is a 44 year old female who presents for f/u eval of chronic right sided radiculopathy. Patient details Sxs improved since last visit. Persistent RT LE sxs.  Patient details bowel/bladder incontinence persistent since Myomectomy in 2012. Patient has a complex Hx related to Uro/gynecologic issues.    05.16.24 43 yo female with severe low back pain and right sided radiculopathy for the past 5 months, worse over the past month. She has a physician prescribing Tramadol and Tylenol with Codeine with minimal relief of symptoms. She has been unable to attend physical therapy as it is too painful. Someone drove her to the office today. Denies any bowel or bladder dysfunction or saddle anesthesia.

## 2024-05-29 NOTE — ADDENDUM
[FreeTextEntry1] :  I, Rosa Pantoja, acted solely as a scribe for Dr. Yony Norman MD on this date 05/29/2024   All medical record entries made by the Scribe were at my, Dr. Yony Norman MD., direction and personally dictated by me on 05/29/2024. I have reviewed the chart and agree that the record accurately reflects my personal performance of the history, physical exam, assessment and plan. I have also personally directed, reviewed, and agreed with the chart.

## 2024-05-29 NOTE — ASSESSMENT
[FreeTextEntry1] : 45 yo female with severe low back pain, right lower extremity radiculopathy, and Uro/gynecologic issues. I had a lengthy discussion with the patient in regard to their treatment plan and diagnosis. Their symptoms have persisted despite the conservative management they have attempted thus far. As a result, [facility] MRI lumbar spine has now been ordered for further evaluation. The patient can take Diclofenac, Tizanidine, Tylenol/NSAIDs as needed for pain control if medically able to. I will have the patient follow-up in approximately 1 week for repeat clinical evaluation, and to review their MRI results. I encouraged them to follow-up sooner if their symptoms worsen or change in any way. Highly encouraged the patient to follow up to the ED for complete evaluation of her symptoms.

## 2024-05-31 ENCOUNTER — APPOINTMENT (OUTPATIENT)
Dept: MRI IMAGING | Facility: CLINIC | Age: 44
End: 2024-05-31
Payer: MEDICAID

## 2024-05-31 ENCOUNTER — OUTPATIENT (OUTPATIENT)
Dept: OUTPATIENT SERVICES | Facility: HOSPITAL | Age: 44
LOS: 1 days | End: 2024-05-31
Payer: MEDICAID

## 2024-05-31 DIAGNOSIS — Z98.890 OTHER SPECIFIED POSTPROCEDURAL STATES: Chronic | ICD-10-CM

## 2024-05-31 DIAGNOSIS — Z90.49 ACQUIRED ABSENCE OF OTHER SPECIFIED PARTS OF DIGESTIVE TRACT: Chronic | ICD-10-CM

## 2024-05-31 DIAGNOSIS — M54.41 LUMBAGO WITH SCIATICA, RIGHT SIDE: ICD-10-CM

## 2024-05-31 PROCEDURE — 72148 MRI LUMBAR SPINE W/O DYE: CPT | Mod: 26

## 2024-05-31 PROCEDURE — 72148 MRI LUMBAR SPINE W/O DYE: CPT

## 2024-06-07 ENCOUNTER — APPOINTMENT (OUTPATIENT)
Dept: ORTHOPEDIC SURGERY | Facility: CLINIC | Age: 44
End: 2024-06-07
Payer: MEDICAID

## 2024-06-07 VITALS — HEIGHT: 66 IN | WEIGHT: 168 LBS | BODY MASS INDEX: 27 KG/M2

## 2024-06-07 DIAGNOSIS — M54.41 LUMBAGO WITH SCIATICA, RIGHT SIDE: ICD-10-CM

## 2024-06-07 PROCEDURE — 99214 OFFICE O/P EST MOD 30 MIN: CPT

## 2024-06-07 RX ORDER — GABAPENTIN 300 MG/1
300 CAPSULE ORAL 3 TIMES DAILY
Qty: 90 | Refills: 1 | Status: ACTIVE | COMMUNITY
Start: 2024-06-07 | End: 1900-01-01

## 2024-06-07 NOTE — ADDENDUM
[FreeTextEntry1] :  I, Rosa Pantoja, acted solely as a scribe for Dr. Yony Norman MD on this date 06/07/2024   All medical record entries made by the Scribe were at my, Dr. Yony Norman MD., direction and personally dictated by me on 06/07/2024. I have reviewed the chart and agree that the record accurately reflects my personal performance of the history, physical exam, assessment and plan. I have also personally directed, reviewed, and agreed with the chart.

## 2024-06-07 NOTE — PHYSICAL EXAM
[de-identified] : Gait - severe antalgic Significant difficulty getting up and down off the exam table or walking.  Station - Normal   Sagittal balance - Normal  Compensatory mechanism - None  heel walk- unable to perform Toe Walk - unable to perform  Reflexes:   Patellar: Normal   Gastroc: Normal   Clonus: No  Straight leg raise: positive on right   Pulses: 2+ dp/pt  Range of motion - normal   Sensation    Sensation is intact to light touch in the L1, L2, L3, L4, L5 and S1 dermatomes bilaterally.  Motor              IP           Quad         HS       TA      Gastroc      EHL Right:   3+/5        3+/5          3/5      2/5        2/5         2/5 Left:     5/5           5/5           5/5      5/5          5/5           5/5 [de-identified] : Lumbar MRI L4-L5 disc protrusion w moderate to severe stenosis RT worse than LT  Xray lumbar spine ordered by outside provider Disc degeneration Facet arthropathy

## 2024-06-07 NOTE — ASSESSMENT
[FreeTextEntry1] : I had a lengthy discussion with the patient in regard to their treatment plan and diagnosis. Their symptoms have persisted despite the conservative management they have attempted thus far. As a result, I would like to proceed with a referral to a pain management specialist to see if they are eligible for an epidural injection and other non-operative treatment options. In tandem with this they should continue with physical therapy/home therapy program. The patient can take Gabapentin, Tizanidine, Diclofenac, Tylenol/NSAIDs as needed for pain control if medically able to. I will have patient follow-up in 2 to 3 weeks for repeat clinical evaluation. I encouraged them to follow-up sooner if their symptoms worsen or change in any way.  Internal Medicine

## 2024-06-07 NOTE — HISTORY OF PRESENT ILLNESS
[de-identified] : Patient is a 44 year old female who presents for f/u eval of RT sided radiculopathy. Reports persistent RT LE pain to the foot. Details shocking pain on RT foot when taking steps. Taking Diclofenac and Tizanidine for pain relief.   05.29.24 Patient is a 44 year old female who presents for f/u eval of chronic right sided radiculopathy. Patient details Sxs improved since last visit. Persistent RT LE sxs.  Patient details bowel/bladder incontinence persistent since Myomectomy in 2012. Patient has a complex Hx related to Uro/gynecologic issues.    05.16.24 43 yo female with severe low back pain and right sided radiculopathy for the past 5 months, worse over the past month. She has a physician prescribing Tramadol and Tylenol with Codeine with minimal relief of symptoms. She has been unable to attend physical therapy as it is too painful. Someone drove her to the office today. Denies any bowel or bladder dysfunction or saddle anesthesia.

## 2024-06-12 RX ORDER — CETIRIZINE HYDROCHLORIDE 10 MG/1
10 TABLET, COATED ORAL
Qty: 90 | Refills: 1 | Status: ACTIVE | COMMUNITY
Start: 2022-11-07 | End: 1900-01-01

## 2024-06-13 RX ORDER — TRAMADOL HYDROCHLORIDE 50 MG/1
50 TABLET, COATED ORAL
Qty: 60 | Refills: 0 | Status: ACTIVE | COMMUNITY
Start: 2023-06-30 | End: 1900-01-01

## 2024-06-28 ENCOUNTER — APPOINTMENT (OUTPATIENT)
Dept: ORTHOPEDIC SURGERY | Facility: CLINIC | Age: 44
End: 2024-06-28

## 2024-07-08 ENCOUNTER — APPOINTMENT (OUTPATIENT)
Dept: ORTHOPEDIC SURGERY | Facility: CLINIC | Age: 44
End: 2024-07-08
Payer: MEDICAID

## 2024-07-08 DIAGNOSIS — M54.41 LUMBAGO WITH SCIATICA, RIGHT SIDE: ICD-10-CM

## 2024-07-08 PROCEDURE — 99215 OFFICE O/P EST HI 40 MIN: CPT

## 2024-08-02 ENCOUNTER — APPOINTMENT (OUTPATIENT)
Dept: INTERNAL MEDICINE | Facility: CLINIC | Age: 44
End: 2024-08-02

## 2024-08-02 VITALS
HEART RATE: 80 BPM | SYSTOLIC BLOOD PRESSURE: 156 MMHG | DIASTOLIC BLOOD PRESSURE: 91 MMHG | HEIGHT: 66 IN | OXYGEN SATURATION: 98 % | RESPIRATION RATE: 16 BRPM | TEMPERATURE: 97.9 F

## 2024-08-02 DIAGNOSIS — Z00.00 ENCOUNTER FOR GENERAL ADULT MEDICAL EXAMINATION W/OUT ABNORMAL FINDINGS: ICD-10-CM

## 2024-08-02 PROCEDURE — 90715 TDAP VACCINE 7 YRS/> IM: CPT

## 2024-08-02 PROCEDURE — 90471 IMMUNIZATION ADMIN: CPT

## 2024-08-02 PROCEDURE — 99396 PREV VISIT EST AGE 40-64: CPT | Mod: 25

## 2024-08-02 PROCEDURE — 36415 COLL VENOUS BLD VENIPUNCTURE: CPT

## 2024-08-02 NOTE — HISTORY OF PRESENT ILLNESS
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

## 2024-08-02 NOTE — PHYSICAL EXAM
[de-identified] : Gen: Adult F, NAD Head: NC/AT EENT: ears grossly normal, PERRL, EOMI, moist mucosa Neck: supple Chest wall: nontender CV: normal s1 +s2, rrr, no murmurs Pulm: CTA-B Abd: soft, NT, ND Skin: no rashes Back: no CVA tenderness, no spinal tenderness Neuro: gait normal, AAOx3 Psych: normal affect, normal interaction

## 2024-08-02 NOTE — ASSESSMENT
[FreeTextEntry1] : 45yo F with chronic pelvic pain and asthma seen for CPE  CPE today Labs today GYN - needs new one UTD Mammogram consider bone density  Discussed ophtho, dental, derm  Elevated BP - order placed for 14-hour BP monitoring previously but pt has not had this done yet  Asthma - stable - Q nasal and singulair have been the most helpful   back  pain - wears a brace, not interested in surgery

## 2024-08-05 LAB
25(OH)D3 SERPL-MCNC: 25.9 NG/ML
ALBUMIN SERPL ELPH-MCNC: 4.3 G/DL
ALP BLD-CCNC: 52 U/L
ALT SERPL-CCNC: 9 U/L
ANION GAP SERPL CALC-SCNC: 13 MMOL/L
AST SERPL-CCNC: 16 U/L
BILIRUB SERPL-MCNC: 0.3 MG/DL
BUN SERPL-MCNC: 10 MG/DL
CALCIUM SERPL-MCNC: 9.7 MG/DL
CHLORIDE SERPL-SCNC: 101 MMOL/L
CHOLEST SERPL-MCNC: 213 MG/DL
CO2 SERPL-SCNC: 26 MMOL/L
CREAT SERPL-MCNC: 1.05 MG/DL
EGFR: 67 ML/MIN/1.73M2
ESTIMATED AVERAGE GLUCOSE: 111 MG/DL
GLUCOSE SERPL-MCNC: 91 MG/DL
HBA1C MFR BLD HPLC: 5.5 %
HCT VFR BLD CALC: 37.9 %
HDLC SERPL-MCNC: 62 MG/DL
HGB BLD-MCNC: 11.9 G/DL
LDLC SERPL CALC-MCNC: 140 MG/DL
MCHC RBC-ENTMCNC: 28 PG
MCHC RBC-ENTMCNC: 31.4 GM/DL
MCV RBC AUTO: 89.2 FL
NONHDLC SERPL-MCNC: 151 MG/DL
PLATELET # BLD AUTO: 313 K/UL
POTASSIUM SERPL-SCNC: 3.5 MMOL/L
PROT SERPL-MCNC: 7.1 G/DL
RBC # BLD: 4.25 M/UL
RBC # FLD: 14.4 %
SODIUM SERPL-SCNC: 141 MMOL/L
TRIGL SERPL-MCNC: 60 MG/DL
TSH SERPL-ACNC: 1.33 UIU/ML
WBC # FLD AUTO: 7.51 K/UL

## 2024-08-09 LAB
M TB IFN-G BLD-IMP: NEGATIVE
QUANTIFERON TB PLUS MITOGEN MINUS NIL: 5.62 IU/ML
QUANTIFERON TB PLUS NIL: 0.02 IU/ML
QUANTIFERON TB PLUS TB1 MINUS NIL: 0 IU/ML
QUANTIFERON TB PLUS TB2 MINUS NIL: 0 IU/ML

## 2024-08-26 ENCOUNTER — RX RENEWAL (OUTPATIENT)
Age: 44
End: 2024-08-26

## 2024-08-27 ENCOUNTER — RX RENEWAL (OUTPATIENT)
Age: 44
End: 2024-08-27

## 2024-10-04 ENCOUNTER — RESULT REVIEW (OUTPATIENT)
Age: 44
End: 2024-10-04

## 2024-10-04 ENCOUNTER — OUTPATIENT (OUTPATIENT)
Dept: OUTPATIENT SERVICES | Facility: HOSPITAL | Age: 44
LOS: 1 days | End: 2024-10-04
Payer: MEDICAID

## 2024-10-04 ENCOUNTER — APPOINTMENT (OUTPATIENT)
Dept: MAMMOGRAPHY | Facility: IMAGING CENTER | Age: 44
End: 2024-10-04
Payer: MEDICAID

## 2024-10-04 DIAGNOSIS — Z98.890 OTHER SPECIFIED POSTPROCEDURAL STATES: Chronic | ICD-10-CM

## 2024-10-04 DIAGNOSIS — Z90.49 ACQUIRED ABSENCE OF OTHER SPECIFIED PARTS OF DIGESTIVE TRACT: Chronic | ICD-10-CM

## 2024-10-04 DIAGNOSIS — Z00.00 ENCOUNTER FOR GENERAL ADULT MEDICAL EXAMINATION WITHOUT ABNORMAL FINDINGS: ICD-10-CM

## 2024-10-04 PROCEDURE — 77067 SCR MAMMO BI INCL CAD: CPT | Mod: 26

## 2024-10-04 PROCEDURE — 77067 SCR MAMMO BI INCL CAD: CPT

## 2024-10-04 PROCEDURE — 77063 BREAST TOMOSYNTHESIS BI: CPT

## 2024-10-04 PROCEDURE — 77063 BREAST TOMOSYNTHESIS BI: CPT | Mod: 26

## 2024-10-09 ENCOUNTER — APPOINTMENT (OUTPATIENT)
Dept: CARDIOLOGY | Facility: CLINIC | Age: 44
End: 2024-10-09
Payer: MEDICAID

## 2024-10-09 VITALS
WEIGHT: 171 LBS | DIASTOLIC BLOOD PRESSURE: 90 MMHG | BODY MASS INDEX: 27.48 KG/M2 | SYSTOLIC BLOOD PRESSURE: 130 MMHG | OXYGEN SATURATION: 99 % | HEART RATE: 84 BPM | HEIGHT: 66 IN

## 2024-10-09 VITALS — DIASTOLIC BLOOD PRESSURE: 80 MMHG | SYSTOLIC BLOOD PRESSURE: 130 MMHG

## 2024-10-09 DIAGNOSIS — R00.0 TACHYCARDIA, UNSPECIFIED: ICD-10-CM

## 2024-10-09 DIAGNOSIS — R03.0 ELEVATED BLOOD-PRESSURE READING, W/OUT DIAGNOSIS OF HYPERTENSION: ICD-10-CM

## 2024-10-09 DIAGNOSIS — I10 ESSENTIAL (PRIMARY) HYPERTENSION: ICD-10-CM

## 2024-10-09 PROCEDURE — 99204 OFFICE O/P NEW MOD 45 MIN: CPT | Mod: 25

## 2024-10-09 PROCEDURE — 93000 ELECTROCARDIOGRAM COMPLETE: CPT

## 2024-10-09 RX ORDER — CETIRIZINE HYDROCHLORIDE 10 MG/1
10 TABLET, COATED ORAL
Refills: 0 | Status: ACTIVE | COMMUNITY

## 2024-10-09 RX ORDER — LABETALOL HYDROCHLORIDE 100 MG/1
100 TABLET, FILM COATED ORAL
Qty: 180 | Refills: 0 | Status: ACTIVE | COMMUNITY
Start: 2024-10-09 | End: 1900-01-01

## 2024-10-18 RX ORDER — EPINEPHRINE 0.3 MG/.3ML
0.3 INJECTION INTRAMUSCULAR
Qty: 2 | Refills: 2 | Status: ACTIVE | COMMUNITY
Start: 2024-10-18 | End: 1900-01-01

## 2024-10-21 ENCOUNTER — APPOINTMENT (OUTPATIENT)
Dept: CARDIOLOGY | Facility: CLINIC | Age: 44
End: 2024-10-21
Payer: MEDICAID

## 2024-10-21 PROCEDURE — 93306 TTE W/DOPPLER COMPLETE: CPT

## 2024-10-25 RX ORDER — BLOOD PRESSURE TEST KIT
KIT MISCELLANEOUS
Qty: 1 | Refills: 0 | Status: ACTIVE | COMMUNITY
Start: 2024-10-25 | End: 1900-01-01

## 2024-10-29 ENCOUNTER — LABORATORY RESULT (OUTPATIENT)
Age: 44
End: 2024-10-29

## 2024-10-29 ENCOUNTER — ASOB RESULT (OUTPATIENT)
Age: 44
End: 2024-10-29

## 2024-10-29 ENCOUNTER — APPOINTMENT (OUTPATIENT)
Dept: ANTEPARTUM | Facility: CLINIC | Age: 44
End: 2024-10-29
Payer: MEDICAID

## 2024-10-29 VITALS — DIASTOLIC BLOOD PRESSURE: 94 MMHG | SYSTOLIC BLOOD PRESSURE: 152 MMHG | HEART RATE: 81 BPM | TEMPERATURE: 98.1 F

## 2024-10-29 DIAGNOSIS — Z31.69 ENCOUNTER FOR OTHER GENERAL COUNSELING AND ADVICE ON PROCREATION: ICD-10-CM

## 2024-10-29 DIAGNOSIS — I10 ESSENTIAL (PRIMARY) HYPERTENSION: ICD-10-CM

## 2024-10-29 DIAGNOSIS — N80.03 ADENOMYOSIS OF THE UTERUS: ICD-10-CM

## 2024-10-29 PROCEDURE — 99205 OFFICE O/P NEW HI 60 MIN: CPT | Mod: 25

## 2024-10-29 RX ORDER — NIFEDIPINE 30 MG/1
30 TABLET, FILM COATED, EXTENDED RELEASE ORAL DAILY
Qty: 60 | Refills: 2 | Status: ACTIVE | COMMUNITY
Start: 2024-10-29 | End: 1900-01-01

## 2024-10-29 RX ORDER — FOLIC ACID 1 MG/1
1 TABLET ORAL
Qty: 90 | Refills: 3 | Status: ACTIVE | COMMUNITY
Start: 2024-10-29 | End: 1900-01-01

## 2024-10-30 RX ORDER — ERGOCALCIFEROL 1.25 MG/1
1.25 MG CAPSULE, LIQUID FILLED ORAL
Qty: 12 | Refills: 2 | Status: ACTIVE | COMMUNITY
Start: 2024-10-30 | End: 1900-01-01

## 2024-10-31 ENCOUNTER — APPOINTMENT (OUTPATIENT)
Dept: CARDIOLOGY | Facility: CLINIC | Age: 44
End: 2024-10-31
Payer: MEDICAID

## 2024-10-31 VITALS
HEIGHT: 66 IN | WEIGHT: 171 LBS | SYSTOLIC BLOOD PRESSURE: 160 MMHG | DIASTOLIC BLOOD PRESSURE: 84 MMHG | BODY MASS INDEX: 27.48 KG/M2 | OXYGEN SATURATION: 99 % | TEMPERATURE: 98.5 F | HEART RATE: 81 BPM

## 2024-10-31 DIAGNOSIS — I10 ESSENTIAL (PRIMARY) HYPERTENSION: ICD-10-CM

## 2024-10-31 DIAGNOSIS — R00.0 TACHYCARDIA, UNSPECIFIED: ICD-10-CM

## 2024-10-31 PROCEDURE — 99214 OFFICE O/P EST MOD 30 MIN: CPT

## 2024-10-31 RX ORDER — LABETALOL HYDROCHLORIDE 200 MG/1
200 TABLET, FILM COATED ORAL TWICE DAILY
Qty: 180 | Refills: 0 | Status: ACTIVE | COMMUNITY
Start: 2024-10-31 | End: 1900-01-01

## 2024-11-06 ENCOUNTER — APPOINTMENT (OUTPATIENT)
Dept: PULMONOLOGY | Facility: CLINIC | Age: 44
End: 2024-11-06
Payer: MEDICAID

## 2024-11-06 VITALS
BODY MASS INDEX: 28.45 KG/M2 | WEIGHT: 177 LBS | SYSTOLIC BLOOD PRESSURE: 139 MMHG | OXYGEN SATURATION: 97 % | DIASTOLIC BLOOD PRESSURE: 76 MMHG | HEIGHT: 66 IN | HEART RATE: 67 BPM

## 2024-11-06 DIAGNOSIS — J45.20 MILD INTERMITTENT ASTHMA, UNCOMPLICATED: ICD-10-CM

## 2024-11-06 PROCEDURE — 99203 OFFICE O/P NEW LOW 30 MIN: CPT

## 2024-11-06 RX ORDER — BUDESONIDE AND FORMOTEROL FUMARATE DIHYDRATE 160; 4.5 UG/1; UG/1
160-4.5 AEROSOL RESPIRATORY (INHALATION)
Qty: 1 | Refills: 11 | Status: ACTIVE | COMMUNITY
Start: 2024-11-06 | End: 1900-01-01

## 2024-11-14 ENCOUNTER — APPOINTMENT (OUTPATIENT)
Dept: MRI IMAGING | Facility: HOSPITAL | Age: 44
End: 2024-11-14

## 2024-11-21 ENCOUNTER — APPOINTMENT (OUTPATIENT)
Dept: CARDIOLOGY | Facility: CLINIC | Age: 44
End: 2024-11-21
Payer: MEDICAID

## 2024-11-21 VITALS
BODY MASS INDEX: 28.77 KG/M2 | HEIGHT: 66 IN | HEART RATE: 75 BPM | DIASTOLIC BLOOD PRESSURE: 74 MMHG | WEIGHT: 179 LBS | SYSTOLIC BLOOD PRESSURE: 130 MMHG | OXYGEN SATURATION: 100 %

## 2024-11-21 DIAGNOSIS — I10 ESSENTIAL (PRIMARY) HYPERTENSION: ICD-10-CM

## 2024-11-21 DIAGNOSIS — Z01.810 ENCOUNTER FOR PREPROCEDURAL CARDIOVASCULAR EXAMINATION: ICD-10-CM

## 2024-11-21 PROCEDURE — 99214 OFFICE O/P EST MOD 30 MIN: CPT

## 2024-12-04 ENCOUNTER — APPOINTMENT (OUTPATIENT)
Dept: MATERNAL FETAL MEDICINE | Facility: CLINIC | Age: 44
End: 2024-12-04
Payer: MEDICAID

## 2024-12-04 ENCOUNTER — ASOB RESULT (OUTPATIENT)
Age: 44
End: 2024-12-04

## 2024-12-04 PROCEDURE — 99215 OFFICE O/P EST HI 40 MIN: CPT

## 2024-12-18 ENCOUNTER — RX RENEWAL (OUTPATIENT)
Age: 44
End: 2024-12-18

## 2024-12-24 ENCOUNTER — APPOINTMENT (OUTPATIENT)
Dept: PULMONOLOGY | Facility: CLINIC | Age: 44
End: 2024-12-24
Payer: MEDICAID

## 2024-12-24 VITALS
OXYGEN SATURATION: 97 % | HEART RATE: 66 BPM | BODY MASS INDEX: 30.83 KG/M2 | SYSTOLIC BLOOD PRESSURE: 128 MMHG | WEIGHT: 174 LBS | HEIGHT: 63 IN | DIASTOLIC BLOOD PRESSURE: 81 MMHG

## 2024-12-24 DIAGNOSIS — J45.20 MILD INTERMITTENT ASTHMA, UNCOMPLICATED: ICD-10-CM

## 2024-12-24 PROCEDURE — ZZZZZ: CPT

## 2024-12-24 PROCEDURE — 94726 PLETHYSMOGRAPHY LUNG VOLUMES: CPT

## 2024-12-24 PROCEDURE — 94060 EVALUATION OF WHEEZING: CPT

## 2024-12-24 PROCEDURE — 94729 DIFFUSING CAPACITY: CPT

## 2024-12-24 PROCEDURE — 99213 OFFICE O/P EST LOW 20 MIN: CPT | Mod: 25,GC

## 2025-01-07 ENCOUNTER — RX RENEWAL (OUTPATIENT)
Age: 45
End: 2025-01-07

## 2025-01-08 ENCOUNTER — APPOINTMENT (OUTPATIENT)
Dept: OPHTHALMOLOGY | Facility: CLINIC | Age: 45
End: 2025-01-08
Payer: MEDICAID

## 2025-01-08 ENCOUNTER — NON-APPOINTMENT (OUTPATIENT)
Age: 45
End: 2025-01-08

## 2025-01-08 PROCEDURE — 92004 COMPRE OPH EXAM NEW PT 1/>: CPT

## 2025-01-08 PROCEDURE — 92014 COMPRE OPH EXAM EST PT 1/>: CPT

## 2025-01-22 DIAGNOSIS — N97.9 FEMALE INFERTILITY, UNSPECIFIED: ICD-10-CM

## 2025-01-24 ENCOUNTER — OUTPATIENT (OUTPATIENT)
Dept: OUTPATIENT SERVICES | Facility: HOSPITAL | Age: 45
LOS: 1 days | Discharge: HOME | End: 2025-01-24

## 2025-01-24 DIAGNOSIS — Z98.890 OTHER SPECIFIED POSTPROCEDURAL STATES: Chronic | ICD-10-CM

## 2025-01-24 DIAGNOSIS — Z90.49 ACQUIRED ABSENCE OF OTHER SPECIFIED PARTS OF DIGESTIVE TRACT: Chronic | ICD-10-CM

## 2025-01-24 DIAGNOSIS — R58 HEMORRHAGE, NOT ELSEWHERE CLASSIFIED: ICD-10-CM

## 2025-01-25 ENCOUNTER — RX RENEWAL (OUTPATIENT)
Age: 45
End: 2025-01-25

## 2025-01-28 ENCOUNTER — NON-APPOINTMENT (OUTPATIENT)
Age: 45
End: 2025-01-28

## 2025-01-28 ENCOUNTER — RESULT REVIEW (OUTPATIENT)
Age: 45
End: 2025-01-28

## 2025-01-28 ENCOUNTER — APPOINTMENT (OUTPATIENT)
Dept: HEMATOLOGY ONCOLOGY | Facility: CLINIC | Age: 45
End: 2025-01-28
Payer: MEDICAID

## 2025-01-28 VITALS
DIASTOLIC BLOOD PRESSURE: 78 MMHG | SYSTOLIC BLOOD PRESSURE: 135 MMHG | BODY MASS INDEX: 27.97 KG/M2 | HEART RATE: 61 BPM | RESPIRATION RATE: 16 BRPM | WEIGHT: 174 LBS | HEIGHT: 66 IN | OXYGEN SATURATION: 98 % | TEMPERATURE: 97.3 F

## 2025-01-28 LAB
BASOPHILS # BLD AUTO: 0.02 K/UL — SIGNIFICANT CHANGE UP (ref 0–0.2)
BASOPHILS NFR BLD AUTO: 0.4 % — SIGNIFICANT CHANGE UP (ref 0–2)
EOSINOPHIL # BLD AUTO: 0.07 K/UL — SIGNIFICANT CHANGE UP (ref 0–0.5)
EOSINOPHIL NFR BLD AUTO: 1.4 % — SIGNIFICANT CHANGE UP (ref 0–6)
HCT VFR BLD CALC: 37.8 % — SIGNIFICANT CHANGE UP (ref 34.5–45)
HGB BLD-MCNC: 12 G/DL — SIGNIFICANT CHANGE UP (ref 11.5–15.5)
IMM GRANULOCYTES NFR BLD AUTO: 0.2 % — SIGNIFICANT CHANGE UP (ref 0–0.9)
LYMPHOCYTES # BLD AUTO: 1.68 K/UL — SIGNIFICANT CHANGE UP (ref 1–3.3)
LYMPHOCYTES # BLD AUTO: 33.6 % — SIGNIFICANT CHANGE UP (ref 13–44)
MCHC RBC-ENTMCNC: 27.6 PG — SIGNIFICANT CHANGE UP (ref 27–34)
MCHC RBC-ENTMCNC: 31.7 G/DL — LOW (ref 32–36)
MCV RBC AUTO: 87.1 FL — SIGNIFICANT CHANGE UP (ref 80–100)
MONOCYTES # BLD AUTO: 0.41 K/UL — SIGNIFICANT CHANGE UP (ref 0–0.9)
MONOCYTES NFR BLD AUTO: 8.2 % — SIGNIFICANT CHANGE UP (ref 2–14)
NEUTROPHILS # BLD AUTO: 2.81 K/UL — SIGNIFICANT CHANGE UP (ref 1.8–7.4)
NEUTROPHILS NFR BLD AUTO: 56.2 % — SIGNIFICANT CHANGE UP (ref 43–77)
NRBC # BLD: 0 /100 WBCS — SIGNIFICANT CHANGE UP (ref 0–0)
NRBC BLD-RTO: 0 /100 WBCS — SIGNIFICANT CHANGE UP (ref 0–0)
PLATELET # BLD AUTO: 215 K/UL — SIGNIFICANT CHANGE UP (ref 150–400)
RBC # BLD: 4.34 M/UL — SIGNIFICANT CHANGE UP (ref 3.8–5.2)
RBC # FLD: 13 % — SIGNIFICANT CHANGE UP (ref 10.3–14.5)
WBC # BLD: 5 K/UL — SIGNIFICANT CHANGE UP (ref 3.8–10.5)
WBC # FLD AUTO: 5 K/UL — SIGNIFICANT CHANGE UP (ref 3.8–10.5)

## 2025-01-28 PROCEDURE — 99204 OFFICE O/P NEW MOD 45 MIN: CPT

## 2025-02-04 ENCOUNTER — LABORATORY RESULT (OUTPATIENT)
Age: 45
End: 2025-02-04

## 2025-02-07 LAB
APTT 2H P 1:4 NP PPP: 35.9 SEC
APTT 2H P INC PPP: 36.1 SEC
APTT BLD: 36.8 SEC
APTT IMM NP/PRE NP PPP: 34.6 SEC
APTT INV RATIO PPP: 36.8 SEC
FACT IX ACT/NOR PPP: 92 %
FACT VIII ACT/NOR PPP: 84 %
FACT XI ACT/NOR PPP: 82 %
FACT XII PPP CHRO-ACNC: 57 %
FIBRINOGEN PPP-MCNC: 398 MG/DL
INR PPP: 1.02 RATIO
NPP NORMAL POOLED PLASMA: 32.5 SECS
PT BLD: 12.1 SEC
VWF AG PPP IA-ACNC: 104 %
VWF:RCO ACT/NOR PPP PL AGG: 84 %

## 2025-02-20 ENCOUNTER — APPOINTMENT (OUTPATIENT)
Dept: HEMATOLOGY ONCOLOGY | Facility: CLINIC | Age: 45
End: 2025-02-20
Payer: MEDICAID

## 2025-02-20 VITALS
HEART RATE: 67 BPM | RESPIRATION RATE: 16 BRPM | OXYGEN SATURATION: 98 % | TEMPERATURE: 98.7 F | SYSTOLIC BLOOD PRESSURE: 147 MMHG | BODY MASS INDEX: 27.44 KG/M2 | DIASTOLIC BLOOD PRESSURE: 77 MMHG | WEIGHT: 170 LBS

## 2025-02-20 DIAGNOSIS — R58 HEMORRHAGE, NOT ELSEWHERE CLASSIFIED: ICD-10-CM

## 2025-02-20 DIAGNOSIS — Z87.898 PERSONAL HISTORY OF OTHER SPECIFIED CONDITIONS: ICD-10-CM

## 2025-02-20 DIAGNOSIS — R21 RASH AND OTHER NONSPECIFIC SKIN ERUPTION: ICD-10-CM

## 2025-02-20 PROCEDURE — 99214 OFFICE O/P EST MOD 30 MIN: CPT

## 2025-03-14 ENCOUNTER — APPOINTMENT (OUTPATIENT)
Dept: INTERNAL MEDICINE | Facility: CLINIC | Age: 45
End: 2025-03-14
Payer: MEDICAID

## 2025-03-14 VITALS
HEIGHT: 66 IN | TEMPERATURE: 98.1 F | SYSTOLIC BLOOD PRESSURE: 127 MMHG | WEIGHT: 180 LBS | BODY MASS INDEX: 28.93 KG/M2 | HEART RATE: 77 BPM | DIASTOLIC BLOOD PRESSURE: 80 MMHG | OXYGEN SATURATION: 96 %

## 2025-03-14 DIAGNOSIS — I10 ESSENTIAL (PRIMARY) HYPERTENSION: ICD-10-CM

## 2025-03-14 DIAGNOSIS — J45.20 MILD INTERMITTENT ASTHMA, UNCOMPLICATED: ICD-10-CM

## 2025-03-14 PROCEDURE — 99214 OFFICE O/P EST MOD 30 MIN: CPT

## 2025-03-14 PROCEDURE — G2211 COMPLEX E/M VISIT ADD ON: CPT | Mod: NC

## 2025-03-14 RX ORDER — NIFEDIPINE 30 MG/1
30 TABLET, EXTENDED RELEASE ORAL
Qty: 90 | Refills: 0 | Status: ACTIVE | COMMUNITY
Start: 2025-03-14 | End: 1900-01-01

## 2025-04-29 ENCOUNTER — APPOINTMENT (OUTPATIENT)
Dept: INTERNAL MEDICINE | Facility: CLINIC | Age: 45
End: 2025-04-29
Payer: MEDICAID

## 2025-04-29 ENCOUNTER — RX RENEWAL (OUTPATIENT)
Age: 45
End: 2025-04-29

## 2025-04-29 VITALS
SYSTOLIC BLOOD PRESSURE: 145 MMHG | OXYGEN SATURATION: 97 % | DIASTOLIC BLOOD PRESSURE: 83 MMHG | TEMPERATURE: 98.1 F | HEIGHT: 66 IN | HEART RATE: 74 BPM | WEIGHT: 177 LBS | BODY MASS INDEX: 28.45 KG/M2

## 2025-04-29 DIAGNOSIS — H57.9 UNSPECIFIED DISORDER OF EYE AND ADNEXA: ICD-10-CM

## 2025-04-29 DIAGNOSIS — J30.2 OTHER SEASONAL ALLERGIC RHINITIS: ICD-10-CM

## 2025-04-29 DIAGNOSIS — J45.20 MILD INTERMITTENT ASTHMA, UNCOMPLICATED: ICD-10-CM

## 2025-04-29 PROCEDURE — 99212 OFFICE O/P EST SF 10 MIN: CPT

## 2025-04-29 RX ORDER — OLOPATADINE HYDROCHLORIDE 2 MG/ML
0.2 SOLUTION/ DROPS OPHTHALMIC DAILY
Qty: 1 | Refills: 2 | Status: ACTIVE | COMMUNITY
Start: 2025-04-29 | End: 1900-01-01

## 2025-05-05 ENCOUNTER — RX RENEWAL (OUTPATIENT)
Age: 45
End: 2025-05-05

## 2025-06-05 ENCOUNTER — RX RENEWAL (OUTPATIENT)
Age: 45
End: 2025-06-05

## 2025-06-25 ENCOUNTER — APPOINTMENT (OUTPATIENT)
Dept: INTERNAL MEDICINE | Facility: CLINIC | Age: 45
End: 2025-06-25

## 2025-06-25 VITALS
BODY MASS INDEX: 27.64 KG/M2 | DIASTOLIC BLOOD PRESSURE: 83 MMHG | TEMPERATURE: 97.9 F | OXYGEN SATURATION: 98 % | SYSTOLIC BLOOD PRESSURE: 128 MMHG | HEIGHT: 66 IN | HEART RATE: 73 BPM | WEIGHT: 172 LBS

## 2025-06-25 PROCEDURE — 99213 OFFICE O/P EST LOW 20 MIN: CPT

## 2025-06-25 PROCEDURE — G2211 COMPLEX E/M VISIT ADD ON: CPT | Mod: NC

## 2025-06-27 ENCOUNTER — APPOINTMENT (OUTPATIENT)
Dept: PULMONOLOGY | Facility: CLINIC | Age: 45
End: 2025-06-27
Payer: MEDICAID

## 2025-06-27 VITALS
WEIGHT: 170 LBS | SYSTOLIC BLOOD PRESSURE: 138 MMHG | HEART RATE: 74 BPM | HEIGHT: 66 IN | OXYGEN SATURATION: 98 % | DIASTOLIC BLOOD PRESSURE: 78 MMHG | BODY MASS INDEX: 27.32 KG/M2

## 2025-06-27 PROCEDURE — 99213 OFFICE O/P EST LOW 20 MIN: CPT

## 2025-07-09 ENCOUNTER — RX RENEWAL (OUTPATIENT)
Age: 45
End: 2025-07-09

## 2025-07-11 ENCOUNTER — RX RENEWAL (OUTPATIENT)
Age: 45
End: 2025-07-11

## 2025-07-21 ENCOUNTER — APPOINTMENT (OUTPATIENT)
Dept: INTERNAL MEDICINE | Facility: CLINIC | Age: 45
End: 2025-07-21
Payer: MEDICAID

## 2025-07-21 VITALS
HEART RATE: 68 BPM | SYSTOLIC BLOOD PRESSURE: 139 MMHG | TEMPERATURE: 98 F | WEIGHT: 170 LBS | BODY MASS INDEX: 27.32 KG/M2 | OXYGEN SATURATION: 98 % | HEIGHT: 66 IN | DIASTOLIC BLOOD PRESSURE: 82 MMHG

## 2025-07-21 DIAGNOSIS — Z00.00 ENCOUNTER FOR GENERAL ADULT MEDICAL EXAMINATION W/OUT ABNORMAL FINDINGS: ICD-10-CM

## 2025-07-21 PROCEDURE — 36415 COLL VENOUS BLD VENIPUNCTURE: CPT

## 2025-07-21 PROCEDURE — 99396 PREV VISIT EST AGE 40-64: CPT

## 2025-08-05 LAB
ALBUMIN SERPL ELPH-MCNC: 4.3 G/DL
ALP BLD-CCNC: 52 U/L
ALT SERPL-CCNC: 10 U/L
ANION GAP SERPL CALC-SCNC: 14 MMOL/L
AST SERPL-CCNC: 19 U/L
BILIRUB SERPL-MCNC: 0.3 MG/DL
BUN SERPL-MCNC: 10 MG/DL
CALCIUM SERPL-MCNC: 9.7 MG/DL
CHLORIDE SERPL-SCNC: 103 MMOL/L
CHOLEST SERPL-MCNC: 208 MG/DL
CO2 SERPL-SCNC: 23 MMOL/L
CREAT SERPL-MCNC: 0.9 MG/DL
EGFRCR SERPLBLD CKD-EPI 2021: 80 ML/MIN/1.73M2
ESTIMATED AVERAGE GLUCOSE: 114 MG/DL
GLUCOSE SERPL-MCNC: 83 MG/DL
HBA1C MFR BLD HPLC: 5.6 %
HBV SURFACE AB SERPL IA-ACNC: 47.9 MIU/ML
HBV SURFACE AG SER QL: NORMAL
HCT VFR BLD CALC: 39 %
HDLC SERPL-MCNC: 64 MG/DL
HGB BLD-MCNC: 12.4 G/DL
LDLC SERPL-MCNC: 134 MG/DL
MCHC RBC-ENTMCNC: 26.4 PG
MCHC RBC-ENTMCNC: 31.8 G/DL
MCV RBC AUTO: 83 FL
NONHDLC SERPL-MCNC: 144 MG/DL
PLATELET # BLD AUTO: 336 K/UL
POTASSIUM SERPL-SCNC: 3.9 MMOL/L
PROT SERPL-MCNC: 7.3 G/DL
RBC # BLD: 4.7 M/UL
RBC # FLD: 13.4 %
SODIUM SERPL-SCNC: 140 MMOL/L
TRIGL SERPL-MCNC: 53 MG/DL
TSH SERPL-ACNC: 1.25 UIU/ML
VIT B12 SERPL-MCNC: 622 PG/ML
WBC # FLD AUTO: 6.31 K/UL

## 2025-08-29 ENCOUNTER — APPOINTMENT (OUTPATIENT)
Dept: MAMMOGRAPHY | Facility: IMAGING CENTER | Age: 45
End: 2025-08-29
Payer: MEDICAID

## 2025-08-29 ENCOUNTER — RESULT REVIEW (OUTPATIENT)
Age: 45
End: 2025-08-29

## 2025-08-29 ENCOUNTER — OUTPATIENT (OUTPATIENT)
Dept: OUTPATIENT SERVICES | Facility: HOSPITAL | Age: 45
LOS: 1 days | End: 2025-08-29
Payer: MEDICAID

## 2025-08-29 DIAGNOSIS — Z98.890 OTHER SPECIFIED POSTPROCEDURAL STATES: Chronic | ICD-10-CM

## 2025-08-29 DIAGNOSIS — Z00.00 ENCOUNTER FOR GENERAL ADULT MEDICAL EXAMINATION WITHOUT ABNORMAL FINDINGS: ICD-10-CM

## 2025-08-29 DIAGNOSIS — Z90.49 ACQUIRED ABSENCE OF OTHER SPECIFIED PARTS OF DIGESTIVE TRACT: Chronic | ICD-10-CM

## 2025-08-29 PROCEDURE — 77067 SCR MAMMO BI INCL CAD: CPT

## 2025-08-29 PROCEDURE — 77067 SCR MAMMO BI INCL CAD: CPT | Mod: 26

## 2025-08-29 PROCEDURE — 77063 BREAST TOMOSYNTHESIS BI: CPT | Mod: 26

## 2025-08-29 PROCEDURE — 77063 BREAST TOMOSYNTHESIS BI: CPT

## 2025-09-03 ENCOUNTER — NON-APPOINTMENT (OUTPATIENT)
Age: 45
End: 2025-09-03